# Patient Record
Sex: FEMALE | Race: WHITE | NOT HISPANIC OR LATINO | Employment: OTHER | URBAN - METROPOLITAN AREA
[De-identification: names, ages, dates, MRNs, and addresses within clinical notes are randomized per-mention and may not be internally consistent; named-entity substitution may affect disease eponyms.]

---

## 2017-02-02 ENCOUNTER — OFFICE VISIT (OUTPATIENT)
Dept: URGENT CARE | Facility: CLINIC | Age: 72
End: 2017-02-02
Payer: MEDICARE

## 2017-02-02 VITALS
RESPIRATION RATE: 18 BRPM | TEMPERATURE: 97.5 F | DIASTOLIC BLOOD PRESSURE: 72 MMHG | SYSTOLIC BLOOD PRESSURE: 110 MMHG | HEART RATE: 78 BPM | OXYGEN SATURATION: 97 %

## 2017-02-02 DIAGNOSIS — J20.8 ACUTE BACTERIAL BRONCHITIS: ICD-10-CM

## 2017-02-02 DIAGNOSIS — B96.89 ACUTE BACTERIAL BRONCHITIS: ICD-10-CM

## 2017-02-02 DIAGNOSIS — I10 ESSENTIAL HYPERTENSION: ICD-10-CM

## 2017-02-02 DIAGNOSIS — J98.8 RTI (RESPIRATORY TRACT INFECTION): ICD-10-CM

## 2017-02-02 PROCEDURE — 3017F COLORECTAL CA SCREEN DOC REV: CPT | Mod: 8P | Performed by: FAMILY MEDICINE

## 2017-02-02 PROCEDURE — G8432 DEP SCR NOT DOC, RNG: HCPCS | Performed by: FAMILY MEDICINE

## 2017-02-02 PROCEDURE — 4004F PT TOBACCO SCREEN RCVD TLK: CPT | Performed by: FAMILY MEDICINE

## 2017-02-02 PROCEDURE — 4040F PNEUMOC VAC/ADMIN/RCVD: CPT | Performed by: FAMILY MEDICINE

## 2017-02-02 PROCEDURE — 1101F PT FALLS ASSESS-DOCD LE1/YR: CPT | Mod: 8P | Performed by: FAMILY MEDICINE

## 2017-02-02 PROCEDURE — 99214 OFFICE O/P EST MOD 30 MIN: CPT | Performed by: FAMILY MEDICINE

## 2017-02-02 PROCEDURE — G8419 CALC BMI OUT NRM PARAM NOF/U: HCPCS | Performed by: FAMILY MEDICINE

## 2017-02-02 PROCEDURE — 3014F SCREEN MAMMO DOC REV: CPT | Performed by: FAMILY MEDICINE

## 2017-02-02 PROCEDURE — G8484 FLU IMMUNIZE NO ADMIN: HCPCS | Performed by: FAMILY MEDICINE

## 2017-02-02 RX ORDER — AMOXICILLIN AND CLAVULANATE POTASSIUM 875; 125 MG/1; MG/1
1 TABLET, FILM COATED ORAL 2 TIMES DAILY
Qty: 14 TAB | Refills: 0 | Status: SHIPPED | OUTPATIENT
Start: 2017-02-02 | End: 2017-02-09

## 2017-02-02 RX ORDER — CODEINE PHOSPHATE AND GUAIFENESIN 10; 100 MG/5ML; MG/5ML
10 SOLUTION ORAL EVERY 6 HOURS PRN
Qty: 240 ML | Refills: 0 | Status: SHIPPED | OUTPATIENT
Start: 2017-02-02 | End: 2018-09-21

## 2017-02-02 ASSESSMENT — ENCOUNTER SYMPTOMS
FEVER: 0
COUGH: 1
HEMOPTYSIS: 0
ORTHOPNEA: 0
SHORTNESS OF BREATH: 0
FOCAL WEAKNESS: 0
CHILLS: 0
DIZZINESS: 0

## 2017-02-02 NOTE — PROGRESS NOTES
Subjective:      Shannan Andres is a 71 y.o. female who presents with Cough    Chief Complaint   Patient presents with   • Cough     Few days stuffy nose , dry cough , chills .        -  4-5 days sinus congestion w/ DC. Ongoing cough w/ sputum since last visit that never really went away. No NVFC    - Hx HTN/RA. Stable on current meds.               Cough  Pertinent negatives include no chest pain, chills, fever, hemoptysis or shortness of breath.       Review of Systems   Constitutional: Negative for fever and chills.   Respiratory: Positive for cough. Negative for hemoptysis and shortness of breath.    Cardiovascular: Negative for chest pain and orthopnea.   Neurological: Negative for dizziness and focal weakness.          Objective:     /72 mmHg  Pulse 78  Temp(Src) 36.4 °C (97.5 °F)  Resp 18  SpO2 97%     Physical Exam   Constitutional: She appears well-developed. No distress.   HENT:   Head: Normocephalic and atraumatic.   Cardiovascular: Regular rhythm.    No murmur heard.  Pulmonary/Chest: Effort normal and breath sounds normal.   Neurological: She is alert.   Skin: Skin is warm and dry.   Psychiatric: She has a normal mood and affect. Judgment normal.   Nursing note and vitals reviewed.              Assessment/Plan:         1. RTI (respiratory tract infection)     2. Acute bacterial bronchitis  amoxicillin-clavulanate (AUGMENTIN) 875-125 MG Tab    guaifenesin-codeine (ROBITUSSIN AC) Solution oral solution             Dx & d/c instructions discussed w/ patient and/or family members. Follow up w/ Prvt Dr or here in 3-4 days if not getting better, sooner if needed,  ER if worse and UC/PCP unavailable.        Possible side effects (i.e. Rash, GI upset/constipation, sedation, elevation of BP or sugars) of any medications given discussed.

## 2017-02-02 NOTE — MR AVS SNAPSHOT
Shannan Andres   2017 1:45 PM   Office Visit   MRN: [id number]    Department:  [facility]   Dept Phone:  [phone]    Description:  Female : 1945   Provider:  Omar Chicas M.D.           Reason for Visit     Cough Few days stuffy nose , dry cough , chills .      Allergies as of 2017     Allergen Noted Reactions    Nkda [No Known Drug Allergy] 2009       Other Environmental 2013   Runny Nose    Runny nose, seasonal hayfever      You were diagnosed with     RTI (respiratory tract infection)   [564965]       Acute bacterial bronchitis   [993175]       Essential hypertension   [7568699]         Vital Signs     Blood Pressure Pulse Temperature Respirations Oxygen Saturation Smoking Status    110/72 mmHg 78 36.4 °C (97.5 °F) 18 97% Current Every Day Smoker      Basic Information     Date Of Birth Sex Race Ethnicity Preferred Language    1945 Female White Non- English      Your appointments     2017  9:30 AM   Follow Up Visit with Indiana Rawls M.D.   [facility] (--)    [address] [place] [address]  [facility] [id number] [phone]           You will be receiving a confirmation call a few days before your appointment from our automated call confirmation system.            2017 11:00 AM   EST MISC Infusion 2 HR with RN 1   Infusion Services ([facility])    [address] [place] [id number]   [phone]            2017 11:00 AM   EST MISC Infusion 2 HR with RN 1   Infusion Services ([facility])    [address] [place] [id number]   [phone]              Problem List              ICD-10-CM Priority Class Noted - Resolved    Seropositive rheumatoid arthritis (CMS-HCC) M05.9   2014 - Present    Osteoarthritis M19.90   2014 - Present    Overweight E66.3   2014 - Present    Hypertension I10   2014 - Present    Seasonal allergies J30.2   2014 - Present    S/P  shoulder surgery Z98.890   2/20/2014 - Present    Fracture of 2nd metatarsal S92.323A   2/20/2014 - Present    Cataracts, both eyes H26.9   2/20/2014 - Present    Menopause Z78.0   2/20/2014 - Present    Hepatitis A B15.9   2/20/2014 - Present    S/P total knee replacement Z96.659   2/20/2014 - Present    Lumbar disc disease M51.9   2/20/2014 - Present    Hypothyroid E03.9   2/20/2014 - Present    H/O arthroscopy of shoulder Z98.890   Unknown - Present    Carpal tunnel syndrome of left wrist G56.02   8/4/2016 - Present      Health Maintenance        Date Due Completion Dates    IMM DTaP/Tdap/Td Vaccine (1 - Tdap) 12/16/1964 ---    PAP SMEAR 12/16/1966 ---    COLONOSCOPY 12/16/1995 ---    IMM ZOSTER VACCINE 12/16/2005 ---    IMM PNEUMOCOCCAL 65+ (ADULT) LOW/MEDIUM RISK SERIES (2 of 2 - PCV13) 11/1/2013 11/1/2012    IMM INFLUENZA (1) 9/1/2016 11/1/2012    MAMMOGRAM 7/20/2017 7/20/2016, 2/26/2015, 10/16/2013, 1/27/2012, 4/8/2010, 4/8/2010, 5/14/2007, 5/14/2007, 3/18/2005    BONE DENSITY 8/25/2021 8/25/2016, 1/27/2012, 5/14/2007            Current Immunizations     Influenza TIV (IM) 11/1/2012    Pneumococcal polysaccharide vaccine (PPSV-23) 11/1/2012    Tuberculin Skin Test 9/2/2014  2:16 PM, 8/25/2014 12:15 PM      Below and/or attached are the medications your provider expects you to take. Review all of your home medications and newly ordered medications with your provider and/or pharmacist. Follow medication instructions as directed by your provider and/or pharmacist. Please keep your medication list with you and share with your provider. Update the information when medications are discontinued, doses are changed, or new medications (including over-the-counter products) are added; and carry medication information at all times in the event of emergency situations     Allergies:  NKDA - (reactions not documented)     OTHER ENVIRONMENTAL - Runny Nose               Medications  Valid as of: February 02, 2017 -  2:18 PM      Generic Name Brand Name Tablet Size Instructions for use    Amoxicillin (Cap) AMOXIL 500 MG Take 500 mg by mouth 3 times a day.        Amoxicillin-Pot Clavulanate (Tab) AUGMENTIN 875-125 MG Take 1 Tab by mouth 2 times a day for 7 days.        Aspirin (Tablet Delayed Response) ECOTRIN 81 MG Take 81 mg by mouth every day.        Azithromycin (Tab) ZITHROMAX 250 MG Take 2 tabs by mouth once today, then one tab by mouth once daily days 2-5.  Complete all medication.        Benzonatate (Cap) TESSALON 100 MG Take 1 Cap by mouth 3 times a day as needed for Cough.        Cholecalciferol (Tab) cholecalciferol 1000 UNIT Take 4,000 Units by mouth every day.        Cimetidine   Take  by mouth.        Folic Acid (Tab) FOLVITE 1 MG TAKE ONE TABLET BY MOUTH ONCE DAILY        Gabapentin (Cap) NEURONTIN 100 MG Take  by mouth every evening.        Guaifenesin-Codeine (Solution) ROBITUSSIN -10 mg/5mL Take 10 mL by mouth every 6 hours as needed for Cough.        Hydrocod Polst-Chlorphen Polst (Suspension Extended Release) TUSSIONEX 10-8 MG/5ML Take 5 mL by mouth every 12 hours.        Hydrocodone-Acetaminophen (Tab) NORCO  MG Take 1-2 Tabs by mouth every 8 hours.        Ibuprofen (Tab) MOTRIN 800 MG Take 800 mg by mouth every 8 hours as needed.        Levothyroxine Sodium (Tab) SYNTHROID 125 MCG Take  by mouth every day.        Lisinopril (Tab) PRINIVIL 20 MG Take  by mouth.Daily pm        Methotrexate Sodium (Tab) methotrexate 2.5 MG TAKE SIX TABLETS BY MOUTH ONCE A WEEK        Oxymetazoline HCl (Solution) AFRIN 0.05 % Spray 2 Sprays in nose 2 times a day. Do not use greater than 3-5 rob   Indications: Stuffy Nose        Simvastatin (Tab) ZOCOR 20 MG Take 20 mg by mouth every evening.        .                 Medicines prescribed today were sent to:     [facility] [place] [facility] [address]    [address] [place] [address]    Phone: [phone] Fax: [phone]    Open 24 Hours?: No       Medication refill instructions:       If your prescription bottle indicates you have medication refills left, it is not necessary to call your provider’s office. Please contact your pharmacy and they will refill your medication.    If your prescription bottle indicates you do not have any refills left, you may request refills at any time through one of the following ways: The online [employer] system (except Urgent Care), by calling your provider’s office, or by asking your pharmacy to contact your provider’s office with a refill request. Medication refills are processed only during regular business hours and may not be available until the next business day. Your provider may request additional information or to have a follow-up visit with you prior to refilling your medication.   *Please Note: Medication refills are assigned a new Rx number when refilled electronically. Your pharmacy may indicate that no refills were authorized even though a new prescription for the same medication is available at the pharmacy. Please request the medicine by name with the pharmacy before contacting your provider for a refill.           [employer] Status: Patient Declined

## 2017-02-06 ENCOUNTER — OFFICE VISIT (OUTPATIENT)
Dept: RHEUMATOLOGY | Facility: PHYSICIAN GROUP | Age: 72
End: 2017-02-06
Payer: MEDICARE

## 2017-02-06 VITALS
TEMPERATURE: 98.6 F | SYSTOLIC BLOOD PRESSURE: 122 MMHG | RESPIRATION RATE: 16 BRPM | WEIGHT: 225 LBS | DIASTOLIC BLOOD PRESSURE: 58 MMHG | HEART RATE: 60 BPM | BODY MASS INDEX: 36.33 KG/M2 | OXYGEN SATURATION: 94 %

## 2017-02-06 DIAGNOSIS — M05.9 SEROPOSITIVE RHEUMATOID ARTHRITIS (HCC): ICD-10-CM

## 2017-02-06 DIAGNOSIS — I10 ESSENTIAL HYPERTENSION: ICD-10-CM

## 2017-02-06 DIAGNOSIS — G56.02 CARPAL TUNNEL SYNDROME OF LEFT WRIST: ICD-10-CM

## 2017-02-06 DIAGNOSIS — E03.8 OTHER SPECIFIED HYPOTHYROIDISM: ICD-10-CM

## 2017-02-06 DIAGNOSIS — M85.80 OSTEOPENIA: ICD-10-CM

## 2017-02-06 PROCEDURE — G8432 DEP SCR NOT DOC, RNG: HCPCS | Performed by: INTERNAL MEDICINE

## 2017-02-06 PROCEDURE — 99214 OFFICE O/P EST MOD 30 MIN: CPT | Performed by: INTERNAL MEDICINE

## 2017-02-06 PROCEDURE — 4004F PT TOBACCO SCREEN RCVD TLK: CPT | Performed by: INTERNAL MEDICINE

## 2017-02-06 PROCEDURE — 3017F COLORECTAL CA SCREEN DOC REV: CPT | Mod: 8P | Performed by: INTERNAL MEDICINE

## 2017-02-06 PROCEDURE — G8419 CALC BMI OUT NRM PARAM NOF/U: HCPCS | Performed by: INTERNAL MEDICINE

## 2017-02-06 PROCEDURE — 3014F SCREEN MAMMO DOC REV: CPT | Performed by: INTERNAL MEDICINE

## 2017-02-06 PROCEDURE — G8484 FLU IMMUNIZE NO ADMIN: HCPCS | Performed by: INTERNAL MEDICINE

## 2017-02-06 PROCEDURE — 4040F PNEUMOC VAC/ADMIN/RCVD: CPT | Performed by: INTERNAL MEDICINE

## 2017-02-06 PROCEDURE — 1101F PT FALLS ASSESS-DOCD LE1/YR: CPT | Mod: 8P | Performed by: INTERNAL MEDICINE
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
## 2017-02-06 NOTE — MR AVS SNAPSHOT
Shannan Andres   2017 9:30 AM   Office Visit   MRN: [id number]    Department:  Rheumatology [facility]   Dept Phone:  [phone]    Description:  Female : 1945   Provider:  Indiana Rawls M.D.           Reason for Visit     Follow-Up           Allergies as of 2017     Allergen Noted Reactions    Nkda [No Known Drug Allergy] 2009       Other Environmental 2013   Runny Nose    Runny nose, seasonal hayfever      You were diagnosed with     Seropositive rheumatoid arthritis (CMS-HCC)   [426428]       Osteopenia   [966092]       Essential hypertension   [0386922]       Other specified hypothyroidism   [2252932]         Vital Signs     Blood Pressure Pulse Temperature Respirations Weight Oxygen Saturation    122/58 mmHg 60 37 °C (98.6 °F) 16 102.059 kg (225 lb) 94%    Smoking Status                   Current Every Day Smoker           Basic Information     Date Of Birth Sex Race Ethnicity Preferred Language    1945 Female White Non- English      Your appointments     2017 11:00 AM   EST MISC Infusion 2 HR with RN 1   Infusion Services ([facility])    [address] [address]   [phone]            2017 11:00 AM   EST MISC Infusion 2 HR with RN 1   Infusion Services ([facility])    [address] [place] [address]   [phone]            Aug 08, 2017  9:45 AM   Follow Up Visit with Indiana Rawls M.D.   [facility] (--)    [address] [place] [address], [address]  [facility] [id number] [phone]           You will be receiving a confirmation call a few days before your appointment from our automated call confirmation system.              Problem List              ICD-10-CM Priority Class Noted - Resolved    Seropositive rheumatoid arthritis (CMS-HCC) M05.9   2014 - Present    Osteoarthritis M19.90   2014 - Present    Overweight E66.3   2014 - Present    Hypertension I10   2014 - Present    Seasonal allergies J30.2   2/19/2014 - Present    S/P shoulder surgery Z98.890   2/20/2014 - Present    Fracture of 2nd metatarsal S92.323A   2/20/2014 - Present    Cataracts, both eyes H26.9   2/20/2014 - Present    Menopause Z78.0   2/20/2014 - Present    Hepatitis A B15.9   2/20/2014 - Present    S/P total knee replacement Z96.659   2/20/2014 - Present    Lumbar disc disease M51.9   2/20/2014 - Present    Hypothyroid E03.9   2/20/2014 - Present    H/O arthroscopy of shoulder Z98.890   Unknown - Present    Carpal tunnel syndrome of left wrist G56.02   8/4/2016 - Present      Health Maintenance        Date Due Completion Dates    IMM DTaP/Tdap/Td Vaccine (1 - Tdap) 12/16/1964 ---    PAP SMEAR 12/16/1966 ---    COLONOSCOPY 12/16/1995 ---    IMM ZOSTER VACCINE 12/16/2005 ---    IMM PNEUMOCOCCAL 65+ (ADULT) LOW/MEDIUM RISK SERIES (2 of 2 - PCV13) 11/1/2013 11/1/2012    IMM INFLUENZA (1) 9/1/2016 11/1/2012    MAMMOGRAM 7/20/2017 7/20/2016, 2/26/2015, 10/16/2013, 1/27/2012, 4/8/2010, 4/8/2010, 5/14/2007, 5/14/2007, 3/18/2005    BONE DENSITY 8/25/2021 8/25/2016, 1/27/2012, 5/14/2007            Current Immunizations     Influenza TIV (IM) 11/1/2012    Pneumococcal polysaccharide vaccine (PPSV-23) 11/1/2012    Tuberculin Skin Test 9/2/2014  2:16 PM, 8/25/2014 12:15 PM      Below and/or attached are the medications your provider expects you to take. Review all of your home medications and newly ordered medications with your provider and/or pharmacist. Follow medication instructions as directed by your provider and/or pharmacist. Please keep your medication list with you and share with your provider. Update the information when medications are discontinued, doses are changed, or new medications (including over-the-counter products) are added; and carry medication information at all times in the event of emergency situations     Allergies:  NKDA - (reactions not documented)     OTHER ENVIRONMENTAL - Runny Nose                  Medications  Valid as of: February 06, 2017 -  9:54 AM    Generic Name Brand Name Tablet Size Instructions for use    Amoxicillin (Cap) AMOXIL 500 MG Take 500 mg by mouth 3 times a day.        Amoxicillin-Pot Clavulanate (Tab) AUGMENTIN 875-125 MG Take 1 Tab by mouth 2 times a day for 7 days.        Aspirin (Tablet Delayed Response) ECOTRIN 81 MG Take 81 mg by mouth every day.        Azithromycin (Tab) ZITHROMAX 250 MG Take 2 tabs by mouth once today, then one tab by mouth once daily days 2-5.  Complete all medication.        Benzonatate (Cap) TESSALON 100 MG Take 1 Cap by mouth 3 times a day as needed for Cough.        Cholecalciferol (Tab) cholecalciferol 1000 UNIT Take 4,000 Units by mouth every day.        Cimetidine   Take  by mouth.        Folic Acid (Tab) FOLVITE 1 MG TAKE ONE TABLET BY MOUTH ONCE DAILY        Gabapentin (Cap) NEURONTIN 100 MG Take  by mouth every evening.        Guaifenesin-Codeine (Solution) ROBITUSSIN -10 mg/5mL Take 10 mL by mouth every 6 hours as needed for Cough.        Hydrocod Polst-Chlorphen Polst (Suspension Extended Release) TUSSIONEX 10-8 MG/5ML Take 5 mL by mouth every 12 hours.        Hydrocodone-Acetaminophen (Tab) NORCO  MG Take 1-2 Tabs by mouth every 8 hours.        Ibuprofen (Tab) MOTRIN 800 MG Take 800 mg by mouth every 8 hours as needed.        Levothyroxine Sodium (Tab) SYNTHROID 125 MCG Take  by mouth every day.        Lisinopril (Tab) PRINIVIL 20 MG Take  by mouth.Daily pm        Methotrexate Sodium (Tab) methotrexate 2.5 MG TAKE SIX TABLETS BY MOUTH ONCE A WEEK        Oxymetazoline HCl (Solution) AFRIN 0.05 % Spray 2 Sprays in nose 2 times a day. Do not use greater than 3-5 rob   Indications: Stuffy Nose        Simvastatin (Tab) ZOCOR 20 MG Take 20 mg by mouth every evening.        .                 Medicines prescribed today were sent to:     [facility] [place] [place] - [address]    [address] [place] [address]    Phone:  [phone] Fax: [phone]    Open 24 Hours?: No      Medication refill instructions:       If your prescription bottle indicates you have medication refills left, it is not necessary to call your provider’s office. Please contact your pharmacy and they will refill your medication.    If your prescription bottle indicates you do not have any refills left, you may request refills at any time through one of the following ways: The online [employer] system (except Urgent Care), by calling your provider’s office, or by asking your pharmacy to contact your provider’s office with a refill request. Medication refills are processed only during regular business hours and may not be available until the next business day. Your provider may request additional information or to have a follow-up visit with you prior to refilling your medication.   *Please Note: Medication refills are assigned a new Rx number when refilled electronically. Your pharmacy may indicate that no refills were authorized even though a new prescription for the same medication is available at the pharmacy. Please request the medicine by name with the pharmacy before contacting your provider for a refill.        Your To Do List     Future Labs/Procedures Complete By Expires    CBC WITH DIFFERENTIAL  3/1/2017 2/6/2018    COMP METABOLIC PANEL  3/1/2017 2/6/2018    WESTERGREN SED RATE  3/1/2017 2/6/2018         [employer] Status: Patient Declined

## 2017-02-06 NOTE — Clinical Note
[facility]   [address], [address]  [place] [address]  Phone: [phone]  Fax: [phone]              Encounter Date: 2/6/2017    Dear Dr. Dixon ref. provider found,    It was a pleasure seeing your patient, Shannan Andres, on 2/6/2017. Diagnoses of Seropositive rheumatoid arthritis (CMS-HCC), Osteopenia, Essential hypertension, Other specified hypothyroidism, and Carpal tunnel syndrome of left wrist were pertinent to this visit.     Please find attached progress note which includes the history I obtained from Ms. Andres, my physical examination findings, my impression and recommendations.      Once again, it was a pleasure participating in your patient's care.  Please feel free to contact me if you have any questions or if I can be of any further assistance to your patients.      Sincerely,    Indiana Rawls M.D.  Electronically Signed          PROGRESS NOTE:  No notes on file

## 2017-02-16 ENCOUNTER — OUTPATIENT INFUSION SERVICES (OUTPATIENT)
Dept: ONCOLOGY | Facility: MEDICAL CENTER | Age: 72
End: 2017-02-16
Attending: INTERNAL MEDICINE
Payer: MEDICARE

## 2017-02-16 VITALS
TEMPERATURE: 97.5 F | SYSTOLIC BLOOD PRESSURE: 121 MMHG | HEIGHT: 66 IN | OXYGEN SATURATION: 99 % | BODY MASS INDEX: 36.56 KG/M2 | DIASTOLIC BLOOD PRESSURE: 72 MMHG | WEIGHT: 227.51 LBS | HEART RATE: 55 BPM | RESPIRATION RATE: 18 BRPM

## 2017-02-16 DIAGNOSIS — M05.9 SEROPOSITIVE RHEUMATOID ARTHRITIS (HCC): ICD-10-CM

## 2017-02-16 PROCEDURE — 700105 HCHG RX REV CODE 258: Performed by: INTERNAL MEDICINE

## 2017-02-16 PROCEDURE — 700111 HCHG RX REV CODE 636 W/ 250 OVERRIDE (IP): Performed by: INTERNAL MEDICINE

## 2017-02-16 PROCEDURE — 96415 CHEMO IV INFUSION ADDL HR: CPT

## 2017-02-16 PROCEDURE — 96413 CHEMO IV INFUSION 1 HR: CPT

## 2017-02-16 RX ADMIN — INFLIXIMAB 300 MG: 100 INJECTION, POWDER, LYOPHILIZED, FOR SOLUTION INTRAVENOUS at 09:50

## 2017-02-16 ASSESSMENT — PAIN SCALES - GENERAL: PAINLEVEL: 3=SLIGHT PAIN

## 2017-02-16 NOTE — Clinical Note
Infusion Services   [address] [place] [address]  [place] [address]  Phone: [phone]  Fax: [phone]              Dear MD Rawls,    Your patient, Shannan Andres (: 1945), was scheduled at [facility].  Shannan's encounter diagnosis is: Rheumatoid Arthritis    She was treated with Remicade  without an incident.      Her next appointment is 17.    For more information, you may review the nurse's progress notes in chart review under the notes section.       Sincerely,  Reny Almazan R.N.

## 2017-02-16 NOTE — PROGRESS NOTES
Pt is here for her scheduled Remicade to treat RA. Does not take premedications. Denies current infections. She did have flu, but has finished her antibiotics and denies any s/s lingering malaise. Infusion completed without an incident. Discharged home to self care. Next appointement verified.

## 2017-04-05 ENCOUNTER — HOSPITAL ENCOUNTER (OUTPATIENT)
Dept: LAB | Facility: MEDICAL CENTER | Age: 72
End: 2017-04-05
Attending: INTERNAL MEDICINE
Payer: MEDICARE

## 2017-04-05 DIAGNOSIS — M05.9 SEROPOSITIVE RHEUMATOID ARTHRITIS (HCC): ICD-10-CM

## 2017-04-05 LAB
ALBUMIN SERPL BCP-MCNC: 3.8 G/DL (ref 3.2–4.9)
ALBUMIN/GLOB SERPL: 1.5 G/DL
ALP SERPL-CCNC: 53 U/L (ref 30–99)
ALT SERPL-CCNC: 16 U/L (ref 2–50)
ANION GAP SERPL CALC-SCNC: 8 MMOL/L (ref 0–11.9)
AST SERPL-CCNC: 26 U/L (ref 12–45)
BASOPHILS # BLD AUTO: 1.1 % (ref 0–1.8)
BASOPHILS # BLD: 0.07 K/UL (ref 0–0.12)
BILIRUB SERPL-MCNC: 0.6 MG/DL (ref 0.1–1.5)
BUN SERPL-MCNC: 17 MG/DL (ref 8–22)
CALCIUM SERPL-MCNC: 8.9 MG/DL (ref 8.4–10.2)
CHLORIDE SERPL-SCNC: 104 MMOL/L (ref 96–112)
CO2 SERPL-SCNC: 27 MMOL/L (ref 20–33)
CREAT SERPL-MCNC: 0.81 MG/DL (ref 0.5–1.4)
EOSINOPHIL # BLD AUTO: 0.27 K/UL (ref 0–0.51)
EOSINOPHIL NFR BLD: 4.1 % (ref 0–6.9)
ERYTHROCYTE [DISTWIDTH] IN BLOOD BY AUTOMATED COUNT: 51.4 FL (ref 35.9–50)
ERYTHROCYTE [SEDIMENTATION RATE] IN BLOOD BY WESTERGREN METHOD: 5 MM/HOUR (ref 0–30)
GFR SERPL CREATININE-BSD FRML MDRD: >60 ML/MIN/1.73 M 2
GLOBULIN SER CALC-MCNC: 2.6 G/DL (ref 1.9–3.5)
GLUCOSE SERPL-MCNC: 97 MG/DL (ref 65–99)
HCT VFR BLD AUTO: 37.3 % (ref 37–47)
HGB BLD-MCNC: 12.5 G/DL (ref 12–16)
IMM GRANULOCYTES # BLD AUTO: 0 K/UL (ref 0–0.11)
IMM GRANULOCYTES NFR BLD AUTO: 0 % (ref 0–0.9)
LYMPHOCYTES # BLD AUTO: 3.67 K/UL (ref 1–4.8)
LYMPHOCYTES NFR BLD: 56.3 % (ref 22–41)
MCH RBC QN AUTO: 32.4 PG (ref 27–33)
MCHC RBC AUTO-ENTMCNC: 33.5 G/DL (ref 33.6–35)
MCV RBC AUTO: 96.6 FL (ref 81.4–97.8)
MONOCYTES # BLD AUTO: 0.48 K/UL (ref 0–0.85)
MONOCYTES NFR BLD AUTO: 7.4 % (ref 0–13.4)
NEUTROPHILS # BLD AUTO: 2.03 K/UL (ref 2–7.15)
NEUTROPHILS NFR BLD: 31.1 % (ref 44–72)
NRBC # BLD AUTO: 0 K/UL
NRBC BLD AUTO-RTO: 0 /100 WBC
PLATELET # BLD AUTO: 162 K/UL (ref 164–446)
PMV BLD AUTO: 12.3 FL (ref 9–12.9)
POTASSIUM SERPL-SCNC: 4.5 MMOL/L (ref 3.6–5.5)
PROT SERPL-MCNC: 6.4 G/DL (ref 6–8.2)
RBC # BLD AUTO: 3.86 M/UL (ref 4.2–5.4)
SODIUM SERPL-SCNC: 139 MMOL/L (ref 135–145)
WBC # BLD AUTO: 6.5 K/UL (ref 4.8–10.8)

## 2017-04-05 PROCEDURE — 85652 RBC SED RATE AUTOMATED: CPT

## 2017-04-05 PROCEDURE — 85025 COMPLETE CBC W/AUTO DIFF WBC: CPT

## 2017-04-05 PROCEDURE — 80053 COMPREHEN METABOLIC PANEL: CPT

## 2017-04-05 PROCEDURE — 36415 COLL VENOUS BLD VENIPUNCTURE: CPT

## 2017-04-13 ENCOUNTER — OUTPATIENT INFUSION SERVICES (OUTPATIENT)
Dept: ONCOLOGY | Facility: MEDICAL CENTER | Age: 72
End: 2017-04-13
Attending: INTERNAL MEDICINE
Payer: MEDICARE

## 2017-04-13 VITALS
HEIGHT: 66 IN | SYSTOLIC BLOOD PRESSURE: 106 MMHG | WEIGHT: 225.75 LBS | HEART RATE: 66 BPM | RESPIRATION RATE: 18 BRPM | TEMPERATURE: 97.5 F | DIASTOLIC BLOOD PRESSURE: 68 MMHG | BODY MASS INDEX: 36.28 KG/M2 | OXYGEN SATURATION: 95 %

## 2017-04-13 PROCEDURE — 700111 HCHG RX REV CODE 636 W/ 250 OVERRIDE (IP): Performed by: INTERNAL MEDICINE

## 2017-04-13 PROCEDURE — 96415 CHEMO IV INFUSION ADDL HR: CPT

## 2017-04-13 PROCEDURE — 700105 HCHG RX REV CODE 258: Performed by: INTERNAL MEDICINE

## 2017-04-13 PROCEDURE — 96413 CHEMO IV INFUSION 1 HR: CPT

## 2017-04-13 RX ORDER — ACETAMINOPHEN 325 MG/1
650 TABLET ORAL ONCE
Status: DISCONTINUED | OUTPATIENT
Start: 2017-04-13 | End: 2017-04-13 | Stop reason: HOSPADM

## 2017-04-13 RX ORDER — CALCIUM CARBONATE 500(1250)
1000 TABLET ORAL 2 TIMES DAILY WITH MEALS
COMMUNITY

## 2017-04-13 RX ADMIN — INFLIXIMAB 300 MG: 100 INJECTION, POWDER, LYOPHILIZED, FOR SOLUTION INTRAVENOUS at 14:05

## 2017-04-13 ASSESSMENT — PAIN SCALES - GENERAL: PAINLEVEL: 8=MODERATE-SEVERE PAIN

## 2017-04-13 NOTE — PROGRESS NOTES
Patient arrived to Infusion for Remicade infusion; reports no changes or concerns.  PIV started, pt refused pre-meds.  Remicade infused over 2 hours, pt tolerated well. PIV removed, gauze pressure dressing in place. Next appts confirmed. Pt left in great spirits under no apparent distress.

## 2017-06-08 ENCOUNTER — OUTPATIENT INFUSION SERVICES (OUTPATIENT)
Dept: ONCOLOGY | Facility: MEDICAL CENTER | Age: 72
End: 2017-06-08
Attending: INTERNAL MEDICINE
Payer: MEDICARE

## 2017-06-08 VITALS
HEIGHT: 66 IN | HEART RATE: 64 BPM | BODY MASS INDEX: 35.68 KG/M2 | TEMPERATURE: 97 F | SYSTOLIC BLOOD PRESSURE: 126 MMHG | DIASTOLIC BLOOD PRESSURE: 63 MMHG | WEIGHT: 222 LBS | RESPIRATION RATE: 18 BRPM | OXYGEN SATURATION: 96 %

## 2017-06-08 PROCEDURE — 96413 CHEMO IV INFUSION 1 HR: CPT

## 2017-06-08 PROCEDURE — 700111 HCHG RX REV CODE 636 W/ 250 OVERRIDE (IP): Performed by: INTERNAL MEDICINE

## 2017-06-08 PROCEDURE — 700105 HCHG RX REV CODE 258: Performed by: INTERNAL MEDICINE

## 2017-06-08 PROCEDURE — 96415 CHEMO IV INFUSION ADDL HR: CPT

## 2017-06-08 RX ADMIN — INFLIXIMAB 300 MG: 100 INJECTION, POWDER, LYOPHILIZED, FOR SOLUTION INTRAVENOUS at 11:39

## 2017-06-08 ASSESSMENT — PAIN SCALES - GENERAL: PAINLEVEL: 7=MODERATE-SEVERE PAIN

## 2017-06-08 NOTE — PROGRESS NOTES
Patient arrived to [facility] ambulatory with front wheel walker for Remicade infusion. Vital signs, denies signs and symptoms of infection. Patient declined pre-treatment medications. IV established in left forearm. Patient tolerated infusion without issue or reaction. Patient provided card for next appointment. IV removed from left forearm, gauze and coband dressing placed. Patient left the [facility] ambulatory with front wheel walker in no signs of distress at 1356.

## 2017-08-01 ENCOUNTER — APPOINTMENT (OUTPATIENT)
Dept: ONCOLOGY | Facility: MEDICAL CENTER | Age: 72
End: 2017-08-01
Attending: INTERNAL MEDICINE
Payer: MEDICARE

## 2017-08-01 VITALS
SYSTOLIC BLOOD PRESSURE: 118 MMHG | HEART RATE: 65 BPM | BODY MASS INDEX: 34.08 KG/M2 | OXYGEN SATURATION: 97 % | WEIGHT: 212.08 LBS | HEIGHT: 66 IN | RESPIRATION RATE: 18 BRPM | TEMPERATURE: 97.4 F | DIASTOLIC BLOOD PRESSURE: 58 MMHG

## 2017-08-01 PROCEDURE — 306780 HCHG STAT FOR TRANSFUSION PER CASE

## 2017-08-01 NOTE — PROGRESS NOTES
"Pt arrived to [facility], ambulatory, for Remicade infusion. Pt states that she recently saw her PCP and had a UA, which showed \"blood in the urine.\" Pt states that they are sending her a new UA kit to re-test for potential UTI. Pt also states that she has had an \"upset stomach\" for a few days. Pt educated about risks of infusing Remicade with an active infection, pt wishes to reschedule after she gets her results. Pt left [facility] with no s/sx of distress. Follow up appointment to be determined by pt after UA.   "

## 2017-08-02 ENCOUNTER — OFFICE VISIT (OUTPATIENT)
Dept: RHEUMATOLOGY | Facility: PHYSICIAN GROUP | Age: 72
End: 2017-08-02
Payer: MEDICARE

## 2017-08-02 VITALS
SYSTOLIC BLOOD PRESSURE: 110 MMHG | TEMPERATURE: 97.9 F | BODY MASS INDEX: 34.17 KG/M2 | WEIGHT: 212.6 LBS | DIASTOLIC BLOOD PRESSURE: 62 MMHG | HEART RATE: 55 BPM | HEIGHT: 66 IN | OXYGEN SATURATION: 96 % | RESPIRATION RATE: 16 BRPM

## 2017-08-02 DIAGNOSIS — M85.89 OSTEOPENIA OF MULTIPLE SITES: ICD-10-CM

## 2017-08-02 DIAGNOSIS — I10 ESSENTIAL HYPERTENSION: ICD-10-CM

## 2017-08-02 DIAGNOSIS — M62.838 MUSCLE SPASM: ICD-10-CM

## 2017-08-02 DIAGNOSIS — Z79.631 ON METHOTREXATE THERAPY: ICD-10-CM

## 2017-08-02 DIAGNOSIS — E03.9 HYPOTHYROIDISM, UNSPECIFIED TYPE: ICD-10-CM

## 2017-08-02 DIAGNOSIS — M05.9 SEROPOSITIVE RHEUMATOID ARTHRITIS (HCC): ICD-10-CM

## 2017-08-02 DIAGNOSIS — G56.02 CARPAL TUNNEL SYNDROME OF LEFT WRIST: ICD-10-CM

## 2017-08-02 DIAGNOSIS — M51.9 LUMBAR DISC DISEASE: ICD-10-CM

## 2017-08-02 DIAGNOSIS — M25.552 PAIN OF LEFT HIP JOINT: ICD-10-CM

## 2017-08-02 PROCEDURE — 700111 HCHG RX REV CODE 636 W/ 250 OVERRIDE (IP)

## 2017-08-02 PROCEDURE — 99214 OFFICE O/P EST MOD 30 MIN: CPT | Performed by: INTERNAL MEDICINE

## 2017-08-02 RX ORDER — TIZANIDINE 4 MG/1
TABLET ORAL
Qty: 30 TAB | Refills: 2 | Status: SHIPPED | OUTPATIENT
Start: 2017-08-02 | End: 2018-09-21

## 2017-08-02 ASSESSMENT — PAIN SCALES - GENERAL: PAINLEVEL: 7=MODERATE-SEVERE PAIN

## 2017-08-02 NOTE — Clinical Note
[facility]   [address], [address]  [place] [address]  Phone: [phone]  Fax: [phone]              Encounter Date: 8/2/2017    Dear Dr. Dixon ref. provider found,    It was a pleasure seeing your patient, Shannan Andres, on 8/2/2017. Diagnoses of Seropositive rheumatoid arthritis (CMS-HCC), Osteopenia of multiple sites, On methotrexate therapy, Pain of left hip joint, Muscle spasm, Carpal tunnel syndrome of left wrist, Lumbar disc disease, Hypothyroidism, unspecified type, and Essential hypertension were pertinent to this visit.     Please find attached progress note which includes the history I obtained from Ms. Andres, my physical examination findings, my impression and recommendations.      Once again, it was a pleasure participating in your patient's care.  Please feel free to contact me if you have any questions or if I can be of any further assistance to your patients.      Sincerely,    Indiana Rawls M.D.  Electronically Signed          PROGRESS NOTE:  Chief Complaint- joint pain    Subjective:   Shannan Andres is a 71 y.o. female here today for follow up of rheumatological issues    This is a follow-up patient to this clinic,  previously followed by Dr Magallanes who is no longer in the clinic for rheumatoid arthritis, currently on Remicade infusions 300 mg every 8 weeks also on methotrexate 7.5 mg by mouth every week with folic acid 1 mg by mouth daily. Patient is currently off of the Remicade because of hematuria of unknown etiology, awaiting the results of her repeat urine test to assure no infection. Also of note, patient's  just recently passed away about a month ago and is grieving, is planning on probably moving to [place] within the year as this is where her daughter and grandson live. Patient denies any new neurological symptoms, denies any fevers of unknown etiology, denies any unexplained weight loss, denies any new rashes. Patient  continues to have intermittent problems with left carpal tunnel, status post cortisone shot without benefit, uses splints and has not wanted to pursue surgical intervention. Patient also complained of night cramping in her legs and also pain down into her left groin. Denies any trauma to her hip.      S/p Cimzia 12/12/12  D/Cd 09/05/2013      Bilateral TKA    Hand X-rays done 5/2008-indicate periarticular osteopenia  Feet X-rays done 5/2008-indicate periarticular osteopenia  DEXA 5/2007 T scores 1.0, 0.1  DEXA 1/2012 T scores 1.5, -0.1  DEXA 8/2016 T scores -0.6, -1.7  PPD neg 8/25/2014 [facility]; Quantiferon Gold neg 9/2016  Hep B neg 4/2016 [facility]; Hep B neg 8/2016; Hep B neg 8/2016       Current medicines (including changes today)  Current Outpatient Prescriptions   Medication Sig Dispense Refill   • InFLIXimab (REMICADE IV) by Intravenous route.     • tizanidine (ZANAFLEX) 4 MG Tab 1 tab po qhs prn muscle spasm 30 Tab 2   • methotrexate 2.5 MG Tab TAKE SIX TABLETS BY MOUTH ONCE A WEEK 84 Tab 0   • Magnesium Hydroxide (MILK OF MAGNESIA PO) Take  by mouth.     • calcium carbonate (CALCIUM CARBONATE) 500 MG Tab Take 1,000 mg by mouth 2 times a day, with meals.     • ibuprofen (MOTRIN) 800 MG Tab Take 800 mg by mouth every 8 hours as needed.     • folic acid (FOLVITE) 1 MG Tab TAKE ONE TABLET BY MOUTH ONCE DAILY 90 Tab 0   • hydrocodone/acetaminophen (NORCO)  MG Tab Take 1-2 Tabs by mouth every 8 hours.     • gabapentin (NEURONTIN) 100 MG CAPS Take  by mouth every evening.     • simvastatin (ZOCOR) 20 MG TABS Take 20 mg by mouth every evening.     • aspirin EC (ECOTRIN) 81 MG TBEC Take 81 mg by mouth every day.     • Cimetidine (EQ ACID REDUCER PO) Take  by mouth.     • vitamin D (CHOLECALCIFEROL) 1000 UNIT TABS Take 4,000 Units by mouth every day.     • LEVOTHYROXINE 125 MCG TABS Take  by mouth every day.     • LISINOPRIL 20 MG TABS Take  by mouth.Daily pm     • guaifenesin-codeine (ROBITUSSIN  "AC) Solution oral solution Take 10 mL by mouth every 6 hours as needed for Cough. 240 mL 0   • Hydrocod Polst-CPM Polst ER (TUSSIONEX) 10-8 MG/5ML Suspension Extended Release Take 5 mL by mouth every 12 hours. 140 mL 0   • benzonatate (TESSALON) 100 MG Cap Take 1 Cap by mouth 3 times a day as needed for Cough. 60 Cap 0   • amoxicillin (AMOXIL) 500 MG Cap Take 500 mg by mouth 3 times a day.     • oxymetazoline (AFRIN) 0.05 % SOLN Spray 2 Sprays in nose 2 times a day. Do not use greater than 3-5 rob   Indications: Stuffy Nose       No current facility-administered medications for this visit.     She  has a past medical history of Heart burn; Pain; Hypertension; Hepatitis A (1974); Indigestion; Arthritis; Cold; Unspecified disorder of thyroid; CATARACT; Seasonal rhinitis; H/O arthroscopy of shoulder; H/O hepatitis; H/O foot surgery; and Foot fracture, right.    ROS   Other than what is mentioned in HPI or physical exam, there is no history of headaches, double vision or blurred vision. No temporal tenderness or jaw claudication. No history of cataracts or glaucoma. No trouble swallowing difficulties or sore throats. No history of thyroid disease. No chest complaints including chest pain, cough or sputum production. No shortness of breath. No GI complaints including nausea, vomiting, change in bowel habits, or past peptic ulcer disease. No history of blood in the stools. No urinary complaints including dysuria or frequency. No history of rash including psoriasis. No history of alopecia, photosensitivity, oral ulcerations, Raynaud's phenomena, or swollen joints. No history of gout. No back complaints. No history of low blood counts.       Objective:     Blood pressure 110/62, pulse 55, temperature 36.6 °C (97.9 °F), resp. rate 16, height 1.676 m (5' 5.98\"), weight 96.435 kg (212 lb 9.6 oz), SpO2 96 %. Body mass index is 34.33 kg/(m^2).   Physical Exam:  GENERAL: Overweight.  Alert and oriented x 3, No apparent distress. " SKIN: No inflammation, normal turgor, no rashes. HEENT: Atraumatic, unremarkable. PERRLA, extraocular movements are intact. Conjunctiva clear. Fundi not examined. Temporal arteries are palpable and non-tender. THROAT: Clear. NECK: Supple with good range of motion including flexion, extension, lateral rotation and bending. No JVD, thyromegaly or adenopathy is appreciated. Carotids are palpable, no bruits. CHEST: Clear to ausculation and percussion bilaterally, no rales or rhonchi. Respiratory efforts good. BREASTS: Not examined. COR: Regular rate and rhythm. No murmurs, rubs or gallops. ABDOMEN: Soft, non-tender, non-distended. Normal active bowel sounds. No organomegaly appreciated. No hepatomegaly. EXTREMITIES: No clubbing, cyanosis, edema. MUSCULOSKELETAL: Both shoulders have full range of motion including internal and external rotation and abduction. Both elbows have full range of motion without  active synovitis. The wrists have good range of motion without limitations. The small joints of the hands are unremarkable without significant swelling or tenderness. There is some mild ulnar deviation of both hands, there is also evidence of ulnar styloid prominence but without synovitis, Do not appreciate any thenar or hyperthenar eminence atrophy on the left There are no tophi or nodules appreciated. The hips, knees, ankles and feet all have good range of motion. Low back is normal. There is no SI tenderness to compression or palpation. There is good lumbar flexion. Patient has hammertoes both feet but no mahesh excoriations or open wounds. RECTAL: Not done. : Not done. NEUROLOGICAL: Grossly intact. Motor and sensory examinations are normal. PULSES. Intact  Lab Results   Component Value Date/Time    QUANTIFERON TB GOLD Negative 09/28/2016 10:27 AM     Lab Results   Component Value Date/Time    HEPATITIS B SURFACE ANTIGEN Negative 08/01/2016 11:31 AM     Lab Results   Component Value Date/Time    SODIUM 139 04/05/2017  09:49 AM    POTASSIUM 4.5 04/05/2017 09:49 AM    CHLORIDE 104 04/05/2017 09:49 AM    CO2 27 04/05/2017 09:49 AM    GLUCOSE 97 04/05/2017 09:49 AM    BUN 17 04/05/2017 09:49 AM    CREATININE 0.81 04/05/2017 09:49 AM      Lab Results   Component Value Date/Time    WBC 6.5 04/05/2017 09:49 AM    RBC 3.86* 04/05/2017 09:49 AM    HEMOGLOBIN 12.5 04/05/2017 09:49 AM    HEMATOCRIT 37.3 04/05/2017 09:49 AM    MCV 96.6 04/05/2017 09:49 AM    MCH 32.4 04/05/2017 09:49 AM    MCHC 33.5* 04/05/2017 09:49 AM    MPV 12.3 04/05/2017 09:49 AM    NEUTROPHILS-POLYS 31.10* 04/05/2017 09:49 AM    LYMPHOCYTES 56.30* 04/05/2017 09:49 AM    MONOCYTES 7.40 04/05/2017 09:49 AM    EOSINOPHILS 4.10 04/05/2017 09:49 AM    BASOPHILS 1.10 04/05/2017 09:49 AM      Lab Results   Component Value Date/Time    CALCIUM 8.9 04/05/2017 09:49 AM    AST(SGOT) 26 04/05/2017 09:49 AM    ALT(SGPT) 16 04/05/2017 09:49 AM    ALKALINE PHOSPHATASE 53 04/05/2017 09:49 AM    TOTAL BILIRUBIN 0.6 04/05/2017 09:49 AM    ALBUMIN 3.8 04/05/2017 09:49 AM    TOTAL PROTEIN 6.4 04/05/2017 09:49 AM     Lab Results   Component Value Date/Time    SED RATE WESTERGREN 5 04/05/2017 09:49 AM     Results for orders placed during the hospital encounter of 05/27/08   DX-JOINT SURVEY-HANDS SINGLE VIEW    Impression IMPRESSION:     OSTEOARTHRITIC TYPE CHANGES INVOLVING THE DISTAL INTERPHALANGEAL JOINTS   WITH SUGGESTION OF PERIARTICULAR OSTEOPOROSIS AND SUBLUXATION AT THE LEFT   THIRD METACARPOPHALANGEAL JOINT MAY INDICATE RHEUMATOID ARTHRITIS.      KALYAN/holly     Read By DANNY CLEMENS MD on May 27 2008  4:34PM  : HOLLY Transcription Date: May 28 2008  7:47PM  THIS DOCUMENT HAS BEEN ELECTRONICALLY SIGNED BY: DANNY CLEMENS MD on   May 28 2008  8:02PM        Results for orders placed during the hospital encounter of 05/27/08   DX-JOINT SURVEY-FEET SINGLE VIEW    Impression IMPRESSION:     1. OSTEOARTHRITIC CHANGE INVOLVING THE FIRST METATARSOPHALANGEAL JOINT.      2.    SUGGESTION OF PERIARTICULAR OSTEOPOROSIS MAY INDICATE RHEUMATOID   ARTHRITIS.         Results for orders placed during the hospital encounter of 08/25/16   DS-BONE DENSITY STUDY (DEXA)    Impression According to the World Health Organization classification, bone mineral density of this patient is osteopenic for the left proximal femur and normal for the distal left forearm.        10-year Probability of Fracture:  Major Osteoporotic     31.8%  Hip     14.0%  Population      [place] ()    Based on left femur neck BMD          INTERPRETING LOCATION:  [address] [place] [address], [id number]     Results for orders placed during the hospital encounter of 03/07/13   DX-KNEES-AP BILATERAL STANDING    Impression Severe right osteoarthritis with lateral tibial subluxation    Left knee arthroplasty without evidence of loosening     Results for orders placed during the hospital encounter of 03/07/13   DX-KNEES-AP BILATERAL STANDING    Impression Severe right osteoarthritis with lateral tibial subluxation    Left knee arthroplasty without evidence of loosening     Results for orders placed during the hospital encounter of 04/10/08   DX-KNEE COMPLETE 4+    Impression IMPRESSION:     MODERATE TO SEVERE TRICOMPARTMENTAL OSTEOARTHRITIS.          Results for orders placed during the hospital encounter of 08/25/09   DX-KNEE 2-    Impression IMPRESSION:     POSTSURGICAL CHANGES SEEN CONSISTENT WITH TRICOMPARTMENT ARTHROPLASTY.     MATTHEW/nilton       Read By DOLLY JOSHUA MD on Aug 25 2009 11:36AM  : ZPT Transcription Date: Aug 25 2009 12:12PM  THIS DOCUMENT HAS BEEN ELECTRONICALLY SIGNED BY: DOLLY JOSHUA MD on Aug   25 2009  3:37PM        Results for orders placed during the hospital encounter of 04/10/08   DX-SHOULDER 2+    Impression IMPRESSION:     STABLE RIGHT SHOULDER WITH DEGENERATIVE CHANGES IN THE INFERIOR   GLENOHUMERAL JOINT AND PROBABLE POSTOPERATIVE CHANGES AT THE AC JOINT.     OFELIA/nilton     Read By ANDREA REYES  MD ALICIA on Apr 11 2008  8:24AM  : GONZALO Transcription Date: Apr 11 2008  7:43PM  THIS DOCUMENT HAS BEEN ELECTRONICALLY SIGNED BY: ANDREA VARGAS MD on   Apr 14 2008  8:30AM        Results for orders placed during the hospital encounter of 01/07/16   MR-LUMBAR SPINE-W/O    Impression 1.  Status post posterior fusion and decompressive laminectomies from L2-L4.    2.  Multilevel degenerative disc disease and facet arthropathy as described. Findings are similar to the prior exam. No discrete disc herniation or isolated nerve root compression is appreciated.    3.  Stable postoperative seroma at the L3 level.     Results for orders placed during the hospital encounter of 04/03/13   DX-CERVICAL SPINE-2 OR 3 VIEWS    Impression Arthropathy as described above.            INTERPRETING LOCATION:  [facility] [place], [address]     Assessment and Plan:     1. Seropositive rheumatoid arthritis (CMS-HCC)  Currently just on methotrexate at 7.5 mg by mouth every week and folic acid 1 mg by mouth daily, currently off of Remicade until hematuria can be resolved.  Next visit check CCP and rheumatoid factor in your calcium level.  - tizanidine (ZANAFLEX) 4 MG Tab; 1 tab po qhs prn muscle spasm  Dispense: 30 Tab; Refill: 2  - DX-HIP-UNILATERAL-W/O PELVIS-2/3 VIEWS LEFT; Future  - methotrexate 2.5 MG Tab; TAKE SIX TABLETS BY MOUTH ONCE A WEEK  Dispense: 84 Tab; Refill: 0  - CBC WITH DIFFERENTIAL; Future  - COMP METABOLIC PANEL; Future  - WESTERGREN SED RATE; Future    2. Osteopenia of multiple sites  Last DEXA August 2016, next DEXA August 2018   continue calcium 1200 mg by mouth daily and vitamin D about 1000 units by mouth daily and magnesium 400 mg by mouth daily  - tizanidine (ZANAFLEX) 4 MG Tab; 1 tab po qhs prn muscle spasm  Dispense: 30 Tab; Refill: 2  - DX-HIP-UNILATERAL-W/O PELVIS-2/3 VIEWS LEFT; Future    3. On methotrexate therapy  Labs to be done every 3 months i.e. CBC, comprehensive panel and  sedimentation rate  - tizanidine (ZANAFLEX) 4 MG Tab; 1 tab po qhs prn muscle spasm  Dispense: 30 Tab; Refill: 2  - DX-HIP-UNILATERAL-W/O PELVIS-2/3 VIEWS LEFT; Future  - CBC WITH DIFFERENTIAL; Future  - COMP METABOLIC PANEL; Future  - WESTERGREN SED RATE; Future    4. Pain of left hip joint  Today we'll do a left hip x-ray for evaluation of chondrocalcinosis first DJD versus erosive arthritis  - tizanidine (ZANAFLEX) 4 MG Tab; 1 tab po qhs prn muscle spasm  Dispense: 30 Tab; Refill: 2  - DX-HIP-UNILATERAL-W/O PELVIS-2/3 VIEWS LEFT; Future    5. Muscle spasm  Do trial tizanidine 4 mg by mouth daily at bedtime when necessary  - tizanidine (ZANAFLEX) 4 MG Tab; 1 tab po qhs prn muscle spasm  Dispense: 30 Tab; Refill: 2  - DX-HIP-UNILATERAL-W/O PELVIS-2/3 VIEWS LEFT; Future    6. Carpal tunnel syndrome of left wrist  Stable, cortisone injections ineffective, patient has a brace she wears at night, does not water pursue surgical intervention at this time  - tizanidine (ZANAFLEX) 4 MG Tab; 1 tab po qhs prn muscle spasm  Dispense: 30 Tab; Refill: 2  - DX-HIP-UNILATERAL-W/O PELVIS-2/3 VIEWS LEFT; Future    7. Lumbar disc disease  - tizanidine (ZANAFLEX) 4 MG Tab; 1 tab po qhs prn muscle spasm  Dispense: 30 Tab; Refill: 2  - DX-HIP-UNILATERAL-W/O PELVIS-2/3 VIEWS LEFT; Future    8. Hypothyroidism, unspecified type  Can exacerbate joint pain, I recommend monitoring thyroid on a regular basis to assure it is not contributing to the patient's joint pain  - tizanidine (ZANAFLEX) 4 MG Tab; 1 tab po qhs prn muscle spasm  Dispense: 30 Tab; Refill: 2  - DX-HIP-UNILATERAL-W/O PELVIS-2/3 VIEWS LEFT; Future    9. Essential hypertension  May impact the type of medications we can use for this patient's arthritis. We will have to keep this under advisement.  - tizanidine (ZANAFLEX) 4 MG Tab; 1 tab po qhs prn muscle spasm  Dispense: 30 Tab; Refill: 2  - DX-HIP-UNILATERAL-W/O PELVIS-2/3 VIEWS LEFT; Future    Followup: Return in about 3  months (around 11/2/2017). or sooner prn    Patient was seen 30 minutes face-to-face of which more than 50% of the time was spent counseling the patient (excluding time for procedures)  regarding  rheumatological condition and care. Therapy was discussed in detail.    Please note that this dictation was created using voice recognition software. I have made every reasonable attempt to correct obvious errors, but I expect that there are errors of grammar and possibly content that I did not discover before finalizing the note.

## 2017-08-02 NOTE — MR AVS SNAPSHOT
"        Shannan Watkinsnd   2017 9:45 AM   Office Visit   MRN: [id number]    Department:  Rheumatology Med [facility]   Dept Phone:  [phone]    Description:  Female : 1945   Provider:  Indiana Rawls M.D.           Reason for Visit     Follow-Up           Allergies as of 2017     Allergen Noted Reactions    Nkda [No Known Drug Allergy] 2009       Other Environmental 2013   Runny Nose    Runny nose, seasonal hayfever      You were diagnosed with     Seropositive rheumatoid arthritis (CMS-HCC)   [952028]       Osteopenia of multiple sites   [7368387]       On methotrexate therapy   [8210640]       Pain of left hip joint   [7962242]       Muscle spasm   [708024]       Carpal tunnel syndrome of left wrist   [428982]       Lumbar disc disease   [898015]       Hypothyroidism, unspecified type   [3440368]       Essential hypertension   [5382996]         Vital Signs     Blood Pressure Pulse Temperature Respirations Height Weight    110/62 mmHg 55 36.6 °C (97.9 °F) 16 1.676 m (5' 5.98\") 96.435 kg (212 lb 9.6 oz)    Body Mass Index Oxygen Saturation Smoking Status             34.33 kg/m2 96% Current Every Day Smoker         Basic Information     Date Of Birth Sex Race Ethnicity Preferred Language    1945 Female White Non- English      Your appointments     2017  9:45 AM   Follow Up Visit with Indiana Rawls M.D.   [facility] (--)    [address] [place] [address], [address] [address] [phone]           You will be receiving a confirmation call a few days before your appointment from our automated call confirmation system.              Problem List              ICD-10-CM Priority Class Noted - Resolved    Seropositive rheumatoid arthritis (CMS-HCC) M05.9   2014 - Present    Osteoarthritis M19.90   2014 - Present    Overweight E66.3   2014 - Present    Hypertension I10   2014 - Present    Seasonal allergies J30.2   2014 - " Present    S/P shoulder surgery Z98.890   2/20/2014 - Present    Fracture of 2nd metatarsal S92.323A   2/20/2014 - Present    Cataracts, both eyes H26.9   2/20/2014 - Present    Menopause Z78.0   2/20/2014 - Present    Hepatitis A B15.9   2/20/2014 - Present    S/P total knee replacement Z96.659   2/20/2014 - Present    Lumbar disc disease M51.9   2/20/2014 - Present    Hypothyroid E03.9   2/20/2014 - Present    H/O arthroscopy of shoulder Z98.890   Unknown - Present    Carpal tunnel syndrome of left wrist G56.02   8/4/2016 - Present      Health Maintenance        Date Due Completion Dates    IMM DTaP/Tdap/Td Vaccine (1 - Tdap) 12/16/1964 ---    PAP SMEAR 12/16/1966 ---    COLONOSCOPY 12/16/1995 ---    IMM ZOSTER VACCINE 12/16/2005 ---    IMM PNEUMOCOCCAL 65+ (ADULT) LOW/MEDIUM RISK SERIES (2 of 2 - PCV13) 11/1/2013 11/1/2012    MAMMOGRAM 7/20/2017 7/20/2016, 2/26/2015, 10/16/2013, 1/27/2012, 4/8/2010, 4/8/2010, 5/14/2007, 5/14/2007, 3/18/2005    IMM INFLUENZA (1) 9/1/2017 11/1/2012    BONE DENSITY 8/25/2021 8/25/2016, 1/27/2012, 5/14/2007            Current Immunizations     Influenza TIV (IM) 11/1/2012    Pneumococcal polysaccharide vaccine (PPSV-23) 11/1/2012    Tuberculin Skin Test 9/2/2014  2:16 PM, 8/25/2014 12:15 PM      Below and/or attached are the medications your provider expects you to take. Review all of your home medications and newly ordered medications with your provider and/or pharmacist. Follow medication instructions as directed by your provider and/or pharmacist. Please keep your medication list with you and share with your provider. Update the information when medications are discontinued, doses are changed, or new medications (including over-the-counter products) are added; and carry medication information at all times in the event of emergency situations     Allergies:  NKDA - (reactions not documented)     OTHER ENVIRONMENTAL - Runny Nose               Medications  Valid as of: August 02,  2017 - 10:33 AM    Generic Name Brand Name Tablet Size Instructions for use    Amoxicillin (Cap) AMOXIL 500 MG Take 500 mg by mouth 3 times a day.        Aspirin (Tablet Delayed Response) ECOTRIN 81 MG Take 81 mg by mouth every day.        Benzonatate (Cap) TESSALON 100 MG Take 1 Cap by mouth 3 times a day as needed for Cough.        Cholecalciferol (Tab) cholecalciferol 1000 UNIT Take 4,000 Units by mouth every day.        Cimetidine   Take  by mouth.        Folic Acid (Tab) FOLVITE 1 MG TAKE ONE TABLET BY MOUTH ONCE DAILY        Gabapentin (Cap) NEURONTIN 100 MG Take  by mouth every evening.        Guaifenesin-Codeine (Solution) ROBITUSSIN -10 mg/5mL Take 10 mL by mouth every 6 hours as needed for Cough.        Hydrocod Polst-Chlorphen Polst (Suspension Extended Release) TUSSIONEX 10-8 MG/5ML Take 5 mL by mouth every 12 hours.        Hydrocodone-Acetaminophen (Tab) NORCO  MG Take 1-2 Tabs by mouth every 8 hours.        Ibuprofen (Tab) MOTRIN 800 MG Take 800 mg by mouth every 8 hours as needed.        InFLIXimab   by Intravenous route.        Levothyroxine Sodium (Tab) SYNTHROID 125 MCG Take  by mouth every day.        Lisinopril (Tab) PRINIVIL 20 MG Take  by mouth.Daily pm        Magnesium Hydroxide   Take  by mouth.        Methotrexate Sodium (Tab) methotrexate 2.5 MG TAKE SIX TABLETS BY MOUTH ONCE A WEEK        Oxymetazoline HCl (Solution) AFRIN 0.05 % Spray 2 Sprays in nose 2 times a day. Do not use greater than 3-5 rob   Indications: Stuffy Nose        Oyster Shell (Tab) OS-PILO 500 500 MG Take 1,000 mg by mouth 2 times a day, with meals.        Simvastatin (Tab) ZOCOR 20 MG Take 20 mg by mouth every evening.        TiZANidine HCl (Tab) ZANAFLEX 4 MG 1 tab po qhs prn muscle spasm        .                 Medicines prescribed today were sent to:     [facility]  [place] - [address]    [address] [place] [address]    Phone: [phone] Fax: [phone]    Open 24  Hours?: No      Medication refill instructions:       If your prescription bottle indicates you have medication refills left, it is not necessary to call your provider’s office. Please contact your pharmacy and they will refill your medication.    If your prescription bottle indicates you do not have any refills left, you may request refills at any time through one of the following ways: The online [employer] system (except Urgent Care), by calling your provider’s office, or by asking your pharmacy to contact your provider’s office with a refill request. Medication refills are processed only during regular business hours and may not be available until the next business day. Your provider may request additional information or to have a follow-up visit with you prior to refilling your medication.   *Please Note: Medication refills are assigned a new Rx number when refilled electronically. Your pharmacy may indicate that no refills were authorized even though a new prescription for the same medication is available at the pharmacy. Please request the medicine by name with the pharmacy before contacting your provider for a refill.        Your To Do List     Future Labs/Procedures Complete By Expires    CBC WITH DIFFERENTIAL  As directed 8/2/2018    COMP METABOLIC PANEL  As directed 8/2/2018    DX-HIP-UNILATERAL-W/O PELVIS-2/3 VIEWS LEFT  As directed 8/2/2018    WESTERGREN SED RATE  As directed 8/2/2018         [facility] Status: Patient Declined        Quit Tobacco Information     Do you want to quit using tobacco?    Quitting tobacco decreases risks of cancer, heart and lung disease, increases life expectancy, improves sense of taste and smell, and increases spending money, among other benefits.    If you are thinking about quitting, we can help.  • [facility] Quit Tobacco Program: 602-202-1305  o Program occurs weekly for four weeks and includes pharmacist consultation on products to support quitting smoking or chewing tobacco.  A provider referral is needed for pharmacist consultation.  • Tobacco Users Help Hotline: 0-420-QUIT-NOW (773-7520) or https://[place].quitlogix.org/  o Free, confidential telephone and online coaching for [place] residents. Sessions are designed on a schedule that is convenient for you. Eligible clients receive free nicotine replacement therapy.  • Nationally: www.smokefree.gov  o Information and professional assistance to support both immediate and long-term needs as you become, and remain, a non-smoker. Smokefree.gov allows you to choose the help that best fits your needs.
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
## 2017-08-02 NOTE — Clinical Note
[facility]   [address], [address]  [place] [address]  Phone: [phone]  Fax: [phone]              Encounter Date: 8/2/2017    Dear Dr. Dixon ref. provider found,    It was a pleasure seeing your patient, Shannan Andres, on 8/2/2017. Diagnoses of Seropositive rheumatoid arthritis (CMS-HCC), Osteopenia of multiple sites, On methotrexate therapy, Pain of left hip joint, Muscle spasm, Carpal tunnel syndrome of left wrist, Lumbar disc disease, Hypothyroidism, unspecified type, and Essential hypertension were pertinent to this visit.     Please find attached progress note which includes the history I obtained from Ms. Andres, my physical examination findings, my impression and recommendations.      Once again, it was a pleasure participating in your patient's care.  Please feel free to contact me if you have any questions or if I can be of any further assistance to your patients.      Sincerely,    Indiana Rawls M.D.  Electronically Signed          PROGRESS NOTE:  Chief Complaint- joint pain    Subjective:   Shannan Andres is a 71 y.o. female here today for follow up of rheumatological issues    This is a follow-up patient to this clinic,  previously followed by Dr Magallanes who is no longer in the clinic for rheumatoid arthritis, currently on Remicade infusions 300 mg every 8 weeks also on methotrexate 7.5 mg by mouth every week with folic acid 1 mg by mouth daily. Patient is currently off of the Remicade because of hematuria of unknown etiology, awaiting the results of her repeat urine test to assure no infection. Also of note, patient's  just recently passed away about a month ago and is grieving, is planning on probably moving to [place] within the year as this is where her daughter and grandson live. Patient denies any new neurological symptoms, denies any fevers of unknown etiology, denies any unexplained weight loss, denies any new rashes. Patient  continues to have intermittent problems with left carpal tunnel, status post cortisone shot without benefit, uses splints and has not wanted to pursue surgical intervention. Patient also complained of night cramping in her legs and also pain down into her left groin. Denies any trauma to her hip.      S/p Cimzia 12/12/12  D/Cd 09/05/2013      Bilateral TKA    Hand X-rays done 5/2008-indicate periarticular osteopenia  Feet X-rays done 5/2008-indicate periarticular osteopenia  DEXA 5/2007 T scores 1.0, 0.1  DEXA 1/2012 T scores 1.5, -0.1  DEXA 8/2016 T scores -0.6, -1.7  PPD neg 8/25/2014 Ren[facility]otky; Quantiferon Gold neg 9/2016  Hep B neg 4/2016 [facility]; Hep B neg 8/2016; Hep B neg 8/2016       Current medicines (including changes today)  Current Outpatient Prescriptions   Medication Sig Dispense Refill   • InFLIXimab (REMICADE IV) by Intravenous route.     • tizanidine (ZANAFLEX) 4 MG Tab 1 tab po qhs prn muscle spasm 30 Tab 2   • methotrexate 2.5 MG Tab TAKE SIX TABLETS BY MOUTH ONCE A WEEK 84 Tab 0   • Magnesium Hydroxide (MILK OF MAGNESIA PO) Take  by mouth.     • calcium carbonate (CALCIUM CARBONATE) 500 MG Tab Take 1,000 mg by mouth 2 times a day, with meals.     • ibuprofen (MOTRIN) 800 MG Tab Take 800 mg by mouth every 8 hours as needed.     • folic acid (FOLVITE) 1 MG Tab TAKE ONE TABLET BY MOUTH ONCE DAILY 90 Tab 0   • hydrocodone/acetaminophen (NORCO)  MG Tab Take 1-2 Tabs by mouth every 8 hours.     • gabapentin (NEURONTIN) 100 MG CAPS Take  by mouth every evening.     • simvastatin (ZOCOR) 20 MG TABS Take 20 mg by mouth every evening.     • aspirin EC (ECOTRIN) 81 MG TBEC Take 81 mg by mouth every day.     • Cimetidine (EQ ACID REDUCER PO) Take  by mouth.     • vitamin D (CHOLECALCIFEROL) 1000 UNIT TABS Take 4,000 Units by mouth every day.     • LEVOTHYROXINE 125 MCG TABS Take  by mouth every day.     • LISINOPRIL 20 MG TABS Take  by mouth.Daily pm     • guaifenesin-codeine (ROBITUSSIN  "AC) Solution oral solution Take 10 mL by mouth every 6 hours as needed for Cough. 240 mL 0   • Hydrocod Polst-CPM Polst ER (TUSSIONEX) 10-8 MG/5ML Suspension Extended Release Take 5 mL by mouth every 12 hours. 140 mL 0   • benzonatate (TESSALON) 100 MG Cap Take 1 Cap by mouth 3 times a day as needed for Cough. 60 Cap 0   • amoxicillin (AMOXIL) 500 MG Cap Take 500 mg by mouth 3 times a day.     • oxymetazoline (AFRIN) 0.05 % SOLN Spray 2 Sprays in nose 2 times a day. Do not use greater than 3-5 rob   Indications: Stuffy Nose       No current facility-administered medications for this visit.     She  has a past medical history of Heart burn; Pain; Hypertension; Hepatitis A (1974); Indigestion; Arthritis; Cold; Unspecified disorder of thyroid; CATARACT; Seasonal rhinitis; H/O arthroscopy of shoulder; H/O hepatitis; H/O foot surgery; and Foot fracture, right.    ROS   Other than what is mentioned in HPI or physical exam, there is no history of headaches, double vision or blurred vision. No temporal tenderness or jaw claudication. No history of cataracts or glaucoma. No trouble swallowing difficulties or sore throats. No history of thyroid disease. No chest complaints including chest pain, cough or sputum production. No shortness of breath. No GI complaints including nausea, vomiting, change in bowel habits, or past peptic ulcer disease. No history of blood in the stools. No urinary complaints including dysuria or frequency. No history of rash including psoriasis. No history of alopecia, photosensitivity, oral ulcerations, Raynaud's phenomena, or swollen joints. No history of gout. No back complaints. No history of low blood counts.       Objective:     Blood pressure 110/62, pulse 55, temperature 36.6 °C (97.9 °F), resp. rate 16, height 1.676 m (5' 5.98\"), weight 96.435 kg (212 lb 9.6 oz), SpO2 96 %. Body mass index is 34.33 kg/(m^2).   Physical Exam:  GENERAL: Overweight.  Alert and oriented x 3, No apparent distress. " SKIN: No inflammation, normal turgor, no rashes. HEENT: Atraumatic, unremarkable. PERRLA, extraocular movements are intact. Conjunctiva clear. Fundi not examined. Temporal arteries are palpable and non-tender. THROAT: Clear. NECK: Supple with good range of motion including flexion, extension, lateral rotation and bending. No JVD, thyromegaly or adenopathy is appreciated. Carotids are palpable, no bruits. CHEST: Clear to ausculation and percussion bilaterally, no rales or rhonchi. Respiratory efforts good. BREASTS: Not examined. COR: Regular rate and rhythm. No murmurs, rubs or gallops. ABDOMEN: Soft, non-tender, non-distended. Normal active bowel sounds. No organomegaly appreciated. No hepatomegaly. EXTREMITIES: No clubbing, cyanosis, edema. MUSCULOSKELETAL: Both shoulders have full range of motion including internal and external rotation and abduction. Both elbows have full range of motion without  active synovitis. The wrists have good range of motion without limitations. The small joints of the hands are unremarkable without significant swelling or tenderness. There is some mild ulnar deviation of both hands, there is also evidence of ulnar styloid prominence but without synovitis, Do not appreciate any thenar or hyperthenar eminence atrophy on the left There are no tophi or nodules appreciated. The hips, knees, ankles and feet all have good range of motion. Low back is normal. There is no SI tenderness to compression or palpation. There is good lumbar flexion. Patient has hammertoes both feet but no mahesh excoriations or open wounds. RECTAL: Not done. : Not done. NEUROLOGICAL: Grossly intact. Motor and sensory examinations are normal. PULSES. Intact  Lab Results   Component Value Date/Time    QUANTIFERON TB GOLD Negative 09/28/2016 10:27 AM     Lab Results   Component Value Date/Time    HEPATITIS B SURFACE ANTIGEN Negative 08/01/2016 11:31 AM     Lab Results   Component Value Date/Time    SODIUM 139 04/05/2017  09:49 AM    POTASSIUM 4.5 04/05/2017 09:49 AM    CHLORIDE 104 04/05/2017 09:49 AM    CO2 27 04/05/2017 09:49 AM    GLUCOSE 97 04/05/2017 09:49 AM    BUN 17 04/05/2017 09:49 AM    CREATININE 0.81 04/05/2017 09:49 AM      Lab Results   Component Value Date/Time    WBC 6.5 04/05/2017 09:49 AM    RBC 3.86* 04/05/2017 09:49 AM    HEMOGLOBIN 12.5 04/05/2017 09:49 AM    HEMATOCRIT 37.3 04/05/2017 09:49 AM    MCV 96.6 04/05/2017 09:49 AM    MCH 32.4 04/05/2017 09:49 AM    MCHC 33.5* 04/05/2017 09:49 AM    MPV 12.3 04/05/2017 09:49 AM    NEUTROPHILS-POLYS 31.10* 04/05/2017 09:49 AM    LYMPHOCYTES 56.30* 04/05/2017 09:49 AM    MONOCYTES 7.40 04/05/2017 09:49 AM    EOSINOPHILS 4.10 04/05/2017 09:49 AM    BASOPHILS 1.10 04/05/2017 09:49 AM      Lab Results   Component Value Date/Time    CALCIUM 8.9 04/05/2017 09:49 AM    AST(SGOT) 26 04/05/2017 09:49 AM    ALT(SGPT) 16 04/05/2017 09:49 AM    ALKALINE PHOSPHATASE 53 04/05/2017 09:49 AM    TOTAL BILIRUBIN 0.6 04/05/2017 09:49 AM    ALBUMIN 3.8 04/05/2017 09:49 AM    TOTAL PROTEIN 6.4 04/05/2017 09:49 AM     Lab Results   Component Value Date/Time    SED RATE WESTERGREN 5 04/05/2017 09:49 AM     Results for orders placed during the hospital encounter of 05/27/08   DX-JOINT SURVEY-HANDS SINGLE VIEW    Impression IMPRESSION:     OSTEOARTHRITIC TYPE CHANGES INVOLVING THE DISTAL INTERPHALANGEAL JOINTS   WITH SUGGESTION OF PERIARTICULAR OSTEOPOROSIS AND SUBLUXATION AT THE LEFT   THIRD METACARPOPHALANGEAL JOINT MAY INDICATE RHEUMATOID ARTHRITIS.      KALYAN/holly     Read By DANNY CLEMENS MD on May 27 2008  4:34PM  : HOLLY Transcription Date: May 28 2008  7:47PM  THIS DOCUMENT HAS BEEN ELECTRONICALLY SIGNED BY: DANNY CLEMENS MD on   May 28 2008  8:02PM        Results for orders placed during the hospital encounter of 05/27/08   DX-JOINT SURVEY-FEET SINGLE VIEW    Impression IMPRESSION:     1. OSTEOARTHRITIC CHANGE INVOLVING THE FIRST METATARSOPHALANGEAL JOINT.      2.    SUGGESTION OF PERIARTICULAR OSTEOPOROSIS MAY INDICATE RHEUMATOID   ARTHRITIS.         Results for orders placed during the hospital encounter of 08/25/16   DS-BONE DENSITY STUDY (DEXA)    Impression According to the World Health Organization classification, bone mineral density of this patient is osteopenic for the left proximal femur and normal for the distal left forearm.        10-year Probability of Fracture:  Major Osteoporotic     31.8%  Hip     14.0%  Population      [place] ()    Based on left femur neck BMD          INTERPRETING LOCATION:  [address] [place] [address], [id number]     Results for orders placed during the hospital encounter of 03/07/13   DX-KNEES-AP BILATERAL STANDING    Impression Severe right osteoarthritis with lateral tibial subluxation    Left knee arthroplasty without evidence of loosening     Results for orders placed during the hospital encounter of 03/07/13   DX-KNEES-AP BILATERAL STANDING    Impression Severe right osteoarthritis with lateral tibial subluxation    Left knee arthroplasty without evidence of loosening     Results for orders placed during the hospital encounter of 04/10/08   DX-KNEE COMPLETE 4+    Impression IMPRESSION:     MODERATE TO SEVERE TRICOMPARTMENTAL OSTEOARTHRITIS.          Results for orders placed during the hospital encounter of 08/25/09   DX-KNEE 2-    Impression IMPRESSION:     POSTSURGICAL CHANGES SEEN CONSISTENT WITH TRICOMPARTMENT ARTHROPLASTY.     MATTHEW/nilton       Read By DOLLY JOSHUA MD on Aug 25 2009 11:36AM  : ZPT Transcription Date: Aug 25 2009 12:12PM  THIS DOCUMENT HAS BEEN ELECTRONICALLY SIGNED BY: DOLLY JOSHUA MD on Aug   25 2009  3:37PM        Results for orders placed during the hospital encounter of 04/10/08   DX-SHOULDER 2+    Impression IMPRESSION:     STABLE RIGHT SHOULDER WITH DEGENERATIVE CHANGES IN THE INFERIOR   GLENOHUMERAL JOINT AND PROBABLE POSTOPERATIVE CHANGES AT THE AC JOINT.     OFELIA/nilton     Read By ANDREA REYES  MD ALICIA on Apr 11 2008  8:24AM  : GONZALO Transcription Date: Apr 11 2008  7:43PM  THIS DOCUMENT HAS BEEN ELECTRONICALLY SIGNED BY: ANDREA VARGAS MD on   Apr 14 2008  8:30AM        Results for orders placed during the hospital encounter of 01/07/16   MR-LUMBAR SPINE-W/O    Impression 1.  Status post posterior fusion and decompressive laminectomies from L2-L4.    2.  Multilevel degenerative disc disease and facet arthropathy as described. Findings are similar to the prior exam. No discrete disc herniation or isolated nerve root compression is appreciated.    3.  Stable postoperative seroma at the L3 level.     Results for orders placed during the hospital encounter of 04/03/13   DX-CERVICAL SPINE-2 OR 3 VIEWS    Impression Arthropathy as described above.            INTERPRETING LOCATION:  [place], [address]     Assessment and Plan:     1. Seropositive rheumatoid arthritis (CMS-HCC)  Currently just on methotrexate at 7.5 mg by mouth every week and folic acid 1 mg by mouth daily, currently off of Remicade until hematuria can be resolved.  Next visit check CCP and rheumatoid factor in your calcium level.  - tizanidine (ZANAFLEX) 4 MG Tab; 1 tab po qhs prn muscle spasm  Dispense: 30 Tab; Refill: 2  - DX-HIP-UNILATERAL-W/O PELVIS-2/3 VIEWS LEFT; Future  - methotrexate 2.5 MG Tab; TAKE SIX TABLETS BY MOUTH ONCE A WEEK  Dispense: 84 Tab; Refill: 0  - CBC WITH DIFFERENTIAL; Future  - COMP METABOLIC PANEL; Future  - WESTERGREN SED RATE; Future    2. Osteopenia of multiple sites  Last DEXA August 2016, next DEXA August 2018   continue calcium 1200 mg by mouth daily and vitamin D about 1000 units by mouth daily and magnesium 400 mg by mouth daily  - tizanidine (ZANAFLEX) 4 MG Tab; 1 tab po qhs prn muscle spasm  Dispense: 30 Tab; Refill: 2  - DX-HIP-UNILATERAL-W/O PELVIS-2/3 VIEWS LEFT; Future    3. On methotrexate therapy  Labs to be done every 3 months i.e. CBC, comprehensive panel and  sedimentation rate  - tizanidine (ZANAFLEX) 4 MG Tab; 1 tab po qhs prn muscle spasm  Dispense: 30 Tab; Refill: 2  - DX-HIP-UNILATERAL-W/O PELVIS-2/3 VIEWS LEFT; Future  - CBC WITH DIFFERENTIAL; Future  - COMP METABOLIC PANEL; Future  - WESTERGREN SED RATE; Future    4. Pain of left hip joint  Today we'll do a left hip x-ray for evaluation of chondrocalcinosis first DJD versus erosive arthritis  - tizanidine (ZANAFLEX) 4 MG Tab; 1 tab po qhs prn muscle spasm  Dispense: 30 Tab; Refill: 2  - DX-HIP-UNILATERAL-W/O PELVIS-2/3 VIEWS LEFT; Future    5. Muscle spasm  Do trial tizanidine 4 mg by mouth daily at bedtime when necessary  - tizanidine (ZANAFLEX) 4 MG Tab; 1 tab po qhs prn muscle spasm  Dispense: 30 Tab; Refill: 2  - DX-HIP-UNILATERAL-W/O PELVIS-2/3 VIEWS LEFT; Future    6. Carpal tunnel syndrome of left wrist  Stable, cortisone injections ineffective, patient has a brace she wears at night, does not water pursue surgical intervention at this time  - tizanidine (ZANAFLEX) 4 MG Tab; 1 tab po qhs prn muscle spasm  Dispense: 30 Tab; Refill: 2  - DX-HIP-UNILATERAL-W/O PELVIS-2/3 VIEWS LEFT; Future    7. Lumbar disc disease  - tizanidine (ZANAFLEX) 4 MG Tab; 1 tab po qhs prn muscle spasm  Dispense: 30 Tab; Refill: 2  - DX-HIP-UNILATERAL-W/O PELVIS-2/3 VIEWS LEFT; Future    8. Hypothyroidism, unspecified type  Can exacerbate joint pain, I recommend monitoring thyroid on a regular basis to assure it is not contributing to the patient's joint pain  - tizanidine (ZANAFLEX) 4 MG Tab; 1 tab po qhs prn muscle spasm  Dispense: 30 Tab; Refill: 2  - DX-HIP-UNILATERAL-W/O PELVIS-2/3 VIEWS LEFT; Future    9. Essential hypertension  ***  - tizanidine (ZANAFLEX) 4 MG Tab; 1 tab po qhs prn muscle spasm  Dispense: 30 Tab; Refill: 2  - DX-HIP-UNILATERAL-W/O PELVIS-2/3 VIEWS LEFT; Future    Followup: Return in about 3 months (around 11/2/2017). or sooner prn    Patient was seen 30 minutes face-to-face of which more than 50% of the time  was spent counseling the patient (excluding time for procedures)  regarding  rheumatological condition and care. Therapy was discussed in detail.    Please note that this dictation was created using voice recognition software. I have made every reasonable attempt to correct obvious errors, but I expect that there are errors of grammar and possibly content that I did not discover before finalizing the note.

## 2017-08-02 NOTE — Clinical Note
[facility]   [address], [address]  [place] [address]  Phone: [phone]  Fax: [phone]              Encounter Date: 8/2/2017    Dear Dr. Dixon ref. provider found,    It was a pleasure seeing your patient, Shannan Andres, on 8/2/2017. Diagnoses of Seropositive rheumatoid arthritis (CMS-HCC), Osteopenia of multiple sites, On methotrexate therapy, Pain of left hip joint, Muscle spasm, Carpal tunnel syndrome of left wrist, Lumbar disc disease, Hypothyroidism, unspecified type, and Essential hypertension were pertinent to this visit.     Please find attached progress note which includes the history I obtained from Ms. Andres, my physical examination findings, my impression and recommendations.      Once again, it was a pleasure participating in your patient's care.  Please feel free to contact me if you have any questions or if I can be of any further assistance to your patients.      Sincerely,    Indiana Rawls M.D.  Electronically Signed          PROGRESS NOTE:  No notes on file

## 2017-08-07 DIAGNOSIS — M05.9 SEROPOSITIVE RHEUMATOID ARTHRITIS (HCC): Primary | ICD-10-CM

## 2017-08-07 NOTE — TELEPHONE ENCOUNTER
Please notify patient that we had to temporarily deny methotrexate until she can get her blood tests done that were ordered August 2, 2017.

## 2017-08-07 NOTE — TELEPHONE ENCOUNTER
1. Caller Name: Shannan Andres                      Call Back Number: [phone]    2. Message: Pt would like to have rx changed back to 3 doses per week, as the pharmacy wants to charge her $129 for the 6 doses per week. She does have one weeks left of medication.    3. Patient approves office to leave a detailed voicemail/MyChart message: N/A

## 2017-08-08 ENCOUNTER — HOSPITAL ENCOUNTER (OUTPATIENT)
Dept: LAB | Facility: MEDICAL CENTER | Age: 72
End: 2017-08-08
Attending: INTERNAL MEDICINE
Payer: MEDICARE

## 2017-08-08 DIAGNOSIS — M05.9 SEROPOSITIVE RHEUMATOID ARTHRITIS (HCC): ICD-10-CM

## 2017-08-08 DIAGNOSIS — Z79.631 ON METHOTREXATE THERAPY: ICD-10-CM

## 2017-08-08 LAB
ALBUMIN SERPL BCP-MCNC: 3.8 G/DL (ref 3.2–4.9)
ALBUMIN/GLOB SERPL: 1.4 G/DL
ALP SERPL-CCNC: 57 U/L (ref 30–99)
ALT SERPL-CCNC: 25 U/L (ref 2–50)
ANION GAP SERPL CALC-SCNC: 5 MMOL/L (ref 0–11.9)
AST SERPL-CCNC: 37 U/L (ref 12–45)
BASOPHILS # BLD AUTO: 0.9 % (ref 0–1.8)
BASOPHILS # BLD: 0.07 K/UL (ref 0–0.12)
BILIRUB SERPL-MCNC: 0.4 MG/DL (ref 0.1–1.5)
BUN SERPL-MCNC: 17 MG/DL (ref 8–22)
CALCIUM SERPL-MCNC: 9.6 MG/DL (ref 8.5–10.5)
CHLORIDE SERPL-SCNC: 107 MMOL/L (ref 96–112)
CO2 SERPL-SCNC: 24 MMOL/L (ref 20–33)
CREAT SERPL-MCNC: 0.83 MG/DL (ref 0.5–1.4)
EOSINOPHIL # BLD AUTO: 0.26 K/UL (ref 0–0.51)
EOSINOPHIL NFR BLD: 3.4 % (ref 0–6.9)
ERYTHROCYTE [DISTWIDTH] IN BLOOD BY AUTOMATED COUNT: 54 FL (ref 35.9–50)
ERYTHROCYTE [SEDIMENTATION RATE] IN BLOOD BY WESTERGREN METHOD: 10 MM/HOUR (ref 0–30)
GFR SERPL CREATININE-BSD FRML MDRD: >60 ML/MIN/1.73 M 2
GLOBULIN SER CALC-MCNC: 2.7 G/DL (ref 1.9–3.5)
GLUCOSE SERPL-MCNC: 94 MG/DL (ref 65–99)
HCT VFR BLD AUTO: 37.4 % (ref 37–47)
HGB BLD-MCNC: 12.2 G/DL (ref 12–16)
IMM GRANULOCYTES # BLD AUTO: 0.01 K/UL (ref 0–0.11)
IMM GRANULOCYTES NFR BLD AUTO: 0.1 % (ref 0–0.9)
LYMPHOCYTES # BLD AUTO: 4.14 K/UL (ref 1–4.8)
LYMPHOCYTES NFR BLD: 54 % (ref 22–41)
MCH RBC QN AUTO: 32.3 PG (ref 27–33)
MCHC RBC AUTO-ENTMCNC: 32.6 G/DL (ref 33.6–35)
MCV RBC AUTO: 98.9 FL (ref 81.4–97.8)
MONOCYTES # BLD AUTO: 0.45 K/UL (ref 0–0.85)
MONOCYTES NFR BLD AUTO: 5.9 % (ref 0–13.4)
NEUTROPHILS # BLD AUTO: 2.73 K/UL (ref 2–7.15)
NEUTROPHILS NFR BLD: 35.7 % (ref 44–72)
NRBC # BLD AUTO: 0 K/UL
NRBC BLD AUTO-RTO: 0 /100 WBC
PLATELET # BLD AUTO: 155 K/UL (ref 164–446)
PMV BLD AUTO: 12.7 FL (ref 9–12.9)
POTASSIUM SERPL-SCNC: 4.4 MMOL/L (ref 3.6–5.5)
PROT SERPL-MCNC: 6.5 G/DL (ref 6–8.2)
RBC # BLD AUTO: 3.78 M/UL (ref 4.2–5.4)
SODIUM SERPL-SCNC: 136 MMOL/L (ref 135–145)
WBC # BLD AUTO: 7.7 K/UL (ref 4.8–10.8)

## 2017-08-08 PROCEDURE — 85025 COMPLETE CBC W/AUTO DIFF WBC: CPT

## 2017-08-08 PROCEDURE — 80053 COMPREHEN METABOLIC PANEL: CPT

## 2017-08-08 PROCEDURE — 85652 RBC SED RATE AUTOMATED: CPT

## 2017-08-08 PROCEDURE — 36415 COLL VENOUS BLD VENIPUNCTURE: CPT

## 2017-09-13 ENCOUNTER — OUTPATIENT INFUSION SERVICES (OUTPATIENT)
Dept: ONCOLOGY | Facility: MEDICAL CENTER | Age: 72
End: 2017-09-13
Attending: INTERNAL MEDICINE
Payer: MEDICARE

## 2017-09-13 VITALS
RESPIRATION RATE: 18 BRPM | BODY MASS INDEX: 33.77 KG/M2 | HEART RATE: 65 BPM | DIASTOLIC BLOOD PRESSURE: 67 MMHG | SYSTOLIC BLOOD PRESSURE: 130 MMHG | HEIGHT: 66 IN | TEMPERATURE: 97.5 F | OXYGEN SATURATION: 94 % | WEIGHT: 210.1 LBS

## 2017-09-13 PROCEDURE — 700111 HCHG RX REV CODE 636 W/ 250 OVERRIDE (IP): Performed by: INTERNAL MEDICINE

## 2017-09-13 PROCEDURE — 96413 CHEMO IV INFUSION 1 HR: CPT

## 2017-09-13 PROCEDURE — 700105 HCHG RX REV CODE 258: Performed by: INTERNAL MEDICINE

## 2017-09-13 PROCEDURE — 96415 CHEMO IV INFUSION ADDL HR: CPT

## 2017-09-13 RX ORDER — ACETAMINOPHEN 325 MG/1
650 TABLET ORAL ONCE
Status: DISCONTINUED | OUTPATIENT
Start: 2017-09-13 | End: 2017-09-13 | Stop reason: HOSPADM

## 2017-09-13 RX ADMIN — INFLIXIMAB 300 MG: 100 INJECTION, POWDER, LYOPHILIZED, FOR SOLUTION INTRAVENOUS at 10:17

## 2017-09-13 ASSESSMENT — PAIN SCALES - GENERAL: PAINLEVEL: 8=MODERATE-SEVERE PAIN

## 2017-09-13 NOTE — PROGRESS NOTES
"Patient arrived ambulatory to the [facility] for Remicade. Reviewed labs, vital signs, and physician order. Patient reports feeling \"much better\" than the previous visit, denies S&S of infection. IV access obtained in right forearm, visualized brisk blood return, patient request to hold pre-medications. Medication administered with filter. Patient tolerated infusion, no adverse reaction noted. Patient provided apt for 8 weeks per MD order, states that \"I'm planning on moving out of the state around October 14 to where my daughter lives, I'll make my next apt, and cancel the apt if I leave before then\". Patient left the [facility] ambulatory with front wheel walker in no signs of distress.   "

## 2017-09-14 ENCOUNTER — OFFICE VISIT (OUTPATIENT)
Dept: URGENT CARE | Facility: CLINIC | Age: 72
End: 2017-09-14
Payer: MEDICARE

## 2017-09-14 VITALS
TEMPERATURE: 97.2 F | DIASTOLIC BLOOD PRESSURE: 70 MMHG | HEIGHT: 68 IN | RESPIRATION RATE: 18 BRPM | WEIGHT: 210 LBS | OXYGEN SATURATION: 96 % | SYSTOLIC BLOOD PRESSURE: 120 MMHG | BODY MASS INDEX: 31.83 KG/M2 | HEART RATE: 72 BPM

## 2017-09-14 DIAGNOSIS — M62.838 CERVICAL PARASPINAL MUSCLE SPASM: ICD-10-CM

## 2017-09-14 DIAGNOSIS — M54.2 CERVICAL PAIN (NECK): ICD-10-CM

## 2017-09-14 PROCEDURE — 99214 OFFICE O/P EST MOD 30 MIN: CPT | Performed by: NURSE PRACTITIONER

## 2017-09-14 RX ORDER — CYCLOBENZAPRINE HCL 5 MG
5-10 TABLET ORAL 3 TIMES DAILY PRN
Qty: 30 TAB | Refills: 0 | Status: SHIPPED | OUTPATIENT
Start: 2017-09-14 | End: 2018-09-21

## 2017-09-14 ASSESSMENT — ENCOUNTER SYMPTOMS
NECK PAIN: 1
PAIN: 1

## 2017-09-14 ASSESSMENT — PAIN SCALES - GENERAL: PAINLEVEL: 8=MODERATE-SEVERE PAIN

## 2017-09-14 NOTE — PROGRESS NOTES
Subjective:      Shannan Andres is a 71 y.o. female who presents with Pain (down L side of neck up head around the ear and into face, thinking shingles but no rash )            Pain   This is a new problem. Episode onset: She reports an onset of left sided neck pain 3 days ago. She feels like the muscle hurts and it is uncomfortable over to her left ear and into her jaw joint. Episode frequency: She reports that the pain is better in the morning but gets worse as the day goes on. Associated symptoms include neck pain. Associated symptoms comments: Reports she has had shingles in the past but on the right side of her neck about 10 years ago. States she has been very stressed recently with the death of her  and now she is packing her house to move to [place]. The symptoms are aggravated by twisting. She has tried NSAIDs, ice and heat for the symptoms. The treatment provided no relief.       Review of Systems   Musculoskeletal: Positive for neck pain.   All other systems reviewed and are negative.    Past Medical History:   Diagnosis Date   • Hepatitis A 1974   • Arthritis     RA, right knee   • CATARACT     small, no surgery yet   • Cold     January   • Foot fracture, right    • H/O arthroscopy of shoulder     right    • H/O foot surgery     Right    • H/O hepatitis    • Heart burn    • Hypertension    • Indigestion    • Pain     shoulders,wrists,fingers,pain scale 4-5   • Seasonal rhinitis     occassional , no rx   • Unspecified disorder of thyroid     hypothyroid      Past Surgical History:   Procedure Laterality Date   • LUMBAR FUSION POSTERIOR  5/2/2013    Performed by Will Hallman M.D. at SURGERY [facility] ORS   • LUMBAR DECOMPRESSION  5/2/2013    Performed by Will Hallman M.D. at SURGERY [facility] ORS   • PB INJ DX/THER AGNT PARAVERT FACET JOINT, NILESH*  10/13/2011    Performed by LACIE GALAN at SURGERY [facility] ORS   • PB INJ DX/THER AGNT PARAVERT FACET JOINT, CE*  10/13/2011     "Performed by LACIE GALAN at SURGERY [facility]S   • IRRIGATION & DEBRIDEMENT ORTHO  1/21/2010    Performed by COTY VANESSA at SURGERY [facility] ORS   • KNEE ARTHROPLASTY TOTAL  8/25/2009    Performed by COTY VANESSA at SURGERY [facility] ORS   • SHOULDER ARTHROSCOPY  2006    right   • FOOT ORIF  1996    right   • KNEE ARTHROSCOPY  1998,2000    right,left      Social History     Social History   • Marital status:      Spouse name: N/A   • Number of children: N/A   • Years of education: N/A     Occupational History   • Not on file.     Social History Main Topics   • Smoking status: Current Every Day Smoker     Packs/day: 0.75     Years: 30.00     Types: Cigarettes   • Smokeless tobacco: Never Used      Comment: 1ppd   • Alcohol use 0.0 oz/week      Comment: occasionally; also uses e-cigarettes   • Drug use: No   • Sexual activity: Not on file     Other Topics Concern   • Not on file     Social History Narrative   • No narrative on file          Objective:     /70   Pulse 72   Temp 36.2 °C (97.2 °F)   Resp 18   Ht 1.727 m (5' 8\")   Wt 95.3 kg (210 lb)   SpO2 96%   Breastfeeding? No   BMI 31.93 kg/m²      Physical Exam   Constitutional: She is oriented to person, place, and time. Vital signs are normal. She appears well-developed and well-nourished.   HENT:   Head: Normocephalic and atraumatic.   Eyes: EOM are normal. Pupils are equal, round, and reactive to light.   Neck: Trachea normal, normal range of motion and phonation normal. Muscular tenderness present. No spinous process tenderness present. No neck rigidity.       Cardiovascular: Normal rate and regular rhythm.    Pulmonary/Chest: Effort normal.   Abdominal: Soft.   Musculoskeletal: Normal range of motion.   Neurological: She is alert and oriented to person, place, and time. She has normal strength. No cranial nerve deficit or sensory deficit.   Skin: Skin is warm and dry. Capillary refill takes less than 2 seconds. "   Psychiatric: She has a normal mood and affect. Her speech is normal and behavior is normal. Thought content normal.   Vitals reviewed.              Assessment/Plan:     1. Cervical pain (neck)    2. Cervical paraspinal muscle spasm  - cyclobenzaprine (FLEXERIL) 5 MG tablet; Take 1-2 Tabs by mouth 3 times a day as needed for Muscle Spasms.  Dispense: 30 Tab; Refill: 0    Discussed with patient to continue with warm and ice compresses  She takes Ibuprofen daily for her RA  Sedating effects of flexeril discussed  If a rash develops and presents like shingles I would like her to contact me, she V/U  Supportive care, differential diagnoses, and indications for immediate follow-up discussed with patient.    Pathogenesis of diagnosis discussed including typical length and natural progression.      Instructed to return to  or nearest emergency department if symptoms fail to improve, for any change in condition, further concerns, or new concerning symptoms.  Patient states understanding of the plan of care and discharge instructions.

## 2017-11-08 ENCOUNTER — APPOINTMENT (OUTPATIENT)
Dept: ONCOLOGY | Facility: MEDICAL CENTER | Age: 72
End: 2017-11-08
Attending: INTERNAL MEDICINE
Payer: MEDICARE

## 2018-09-21 ENCOUNTER — OFFICE VISIT (OUTPATIENT)
Dept: URGENT CARE | Facility: CLINIC | Age: 73
End: 2018-09-21
Payer: MEDICARE

## 2018-09-21 VITALS
BODY MASS INDEX: 31.64 KG/M2 | DIASTOLIC BLOOD PRESSURE: 60 MMHG | HEART RATE: 64 BPM | WEIGHT: 221 LBS | OXYGEN SATURATION: 96 % | RESPIRATION RATE: 16 BRPM | SYSTOLIC BLOOD PRESSURE: 110 MMHG | HEIGHT: 70 IN | TEMPERATURE: 97.4 F

## 2018-09-21 DIAGNOSIS — H81.10 BENIGN PAROXYSMAL POSITIONAL VERTIGO, UNSPECIFIED LATERALITY: ICD-10-CM

## 2018-09-21 DIAGNOSIS — R09.81 NASAL CONGESTION: ICD-10-CM

## 2018-09-21 PROCEDURE — 99213 OFFICE O/P EST LOW 20 MIN: CPT | Performed by: NURSE PRACTITIONER

## 2018-09-21 RX ORDER — MELOXICAM 15 MG/1
15 TABLET ORAL DAILY
COMMUNITY

## 2018-09-21 RX ORDER — MECLIZINE HCL 12.5 MG/1
12.5 TABLET ORAL 3 TIMES DAILY PRN
Qty: 20 TAB | Refills: 0 | Status: SHIPPED | OUTPATIENT
Start: 2018-09-21

## 2018-09-21 RX ORDER — FLUTICASONE PROPIONATE 50 MCG
1 SPRAY, SUSPENSION (ML) NASAL 2 TIMES DAILY
Qty: 16 G | Refills: 0 | Status: SHIPPED | OUTPATIENT
Start: 2018-09-21

## 2018-09-21 NOTE — PROGRESS NOTES
"Chief Complaint   Patient presents with   • Dizziness     possible vertigo,started yesterday\"The room is spinning\"episode of vomiting       HISTORY OF PRESENT ILLNESS: Patient is a 72 y.o. female who presents to urgent care today with complaints of dizziness. States that for the past week she has had three days of feeling dizzy senstation. The  Dizziness is described as \"the room is spinning,\" is exacerbated by movement of her head, improves with stillness and rest. She admits to nausea with vomiting during the first episode but has improved since. She denies headache, fatigue, chest pain, neck pain, numbness, tingling, weakness, confusion, droop. She does admit to recent ear pressure and chronic congestion. She uses Afrin for her congestion. She has tried using OTC dramamine with improvement in her symptoms. She admits to a history of vertigo and this feels similar.       Patient Active Problem List    Diagnosis Date Noted   • Carpal tunnel syndrome of left wrist 08/04/2016   • H/O arthroscopy of shoulder    • S/P shoulder surgery 02/20/2014   • Fracture of 2nd metatarsal 02/20/2014   • Cataracts, both eyes 02/20/2014   • Menopause 02/20/2014   • Hepatitis A 02/20/2014   • S/P total knee replacement 02/20/2014   • Lumbar disc disease 02/20/2014   • Hypothyroid 02/20/2014   • Seropositive rheumatoid arthritis (HCC) 02/19/2014   • Osteoarthritis 02/19/2014   • Overweight 02/19/2014   • Hypertension 02/19/2014   • Seasonal allergies 02/19/2014       Allergies:Nkda [no known drug allergy] and Other environmental    Current Outpatient Prescriptions Ordered in [facility]   Medication Sig Dispense Refill   • meloxicam (MOBIC) 15 MG tablet Take 15 mg by mouth every day.     • simvastatin (ZOCOR) 20 MG TABS Take 20 mg by mouth every evening.     • LEVOTHYROXINE 125 MCG TABS Take  by mouth every day.     • InFLIXimab (REMICADE IV) by Intravenous route.     • methotrexate 2.5 MG Tab TAKE SIX TABLETS BY MOUTH ONCE A WEEK 84 Tab 0 "   • Magnesium Hydroxide (MILK OF MAGNESIA PO) Take  by mouth.     • calcium carbonate (CALCIUM CARBONATE) 500 MG Tab Take 1,000 mg by mouth 2 times a day, with meals.     • ibuprofen (MOTRIN) 800 MG Tab Take 800 mg by mouth every 8 hours as needed.     • hydrocodone/acetaminophen (NORCO)  MG Tab Take 1-2 Tabs by mouth every 8 hours.     • gabapentin (NEURONTIN) 100 MG CAPS Take  by mouth every evening.     • Cimetidine (EQ ACID REDUCER PO) Take  by mouth.     • vitamin D (CHOLECALCIFEROL) 1000 UNIT TABS Take 4,000 Units by mouth every day.     • oxymetazoline (AFRIN) 0.05 % SOLN Spray 2 Sprays in nose 2 times a day. Do not use greater than 3-5 rob   Indications: Stuffy Nose     • LISINOPRIL 20 MG TABS Take  by mouth.Daily pm       No current Epic-ordered facility-administered medications on file.        Past Medical History:   Diagnosis Date   • Arthritis     RA, right knee   • CATARACT     small, no surgery yet   • Cold     January   • Foot fracture, right    • H/O arthroscopy of shoulder     right    • H/O foot surgery     Right    • H/O hepatitis    • Heart burn    • Hepatitis A 1974   • Hypertension    • Indigestion    • Pain     shoulders,wrists,fingers,pain scale 4-5   • Seasonal rhinitis     occassional , no rx   • Unspecified disorder of thyroid     hypothyroid       Social History   Substance Use Topics   • Smoking status: Current Every Day Smoker     Packs/day: 0.75     Years: 30.00     Types: Cigarettes   • Smokeless tobacco: Never Used      Comment: 1ppd   • Alcohol use 0.0 oz/week      Comment: occasionally; also uses e-cigarettes       No family status information on file.     Family History   Problem Relation Age of Onset   • Cancer Maternal Aunt         breast       ROS:  Review of Systems   Constitutional: Negative for fever, chills, weight loss, malaise, and fatigue.   HENT: Positive for ear pressure, nasal congestion. Negative for ear pain, nosebleeds, sore throat and neck pain.    Eyes:  "Negative for vision changes.   Neuro: Positive for vertigo sensation. Negative for headache, sensory changes, weakness, seizure, LOC.   Cardiovascular: Negative for chest pain, palpitations, orthopnea and leg swelling.   Respiratory: Negative for cough, sputum production, shortness of breath and wheezing.   Gastrointestinal: Negative for abdominal pain, nausea, vomiting or diarrhea.   Genitourinary: Negative for dysuria, urgency and frequency.  Musculoskeletal: Negative for falls, neck pain, back pain, joint pain, myalgias.   Skin: Negative for rash, diaphoresis.     Exam:  Blood pressure 110/60, pulse 64, temperature 36.3 °C (97.4 °F), temperature source Temporal, resp. rate 16, height 1.778 m (5' 10\"), weight 100.2 kg (221 lb), SpO2 96 %, not currently breastfeeding.  General: well-nourished, well-developed female in NAD  Head: normocephalic, atraumatic  Eyes: PERRLA, no conjunctival injection, acuity grossly intact, lids normal.  Ears: normal shape and symmetry, no tenderness, no discharge. External canals are without any significant edema or erythema. Tympanic membranes are without any inflammation, no effusion. Gross auditory acuity is intact.  Nose: symmetrical without tenderness, clear discharge.  Mouth/Throat: reasonable hygiene, no erythema, exudates or tonsillar enlargement. PND noted.   Neck: no masses, range of motion within normal limits, no tracheal deviation. No obvious thyroid enlargement.   Lymph: no cervical adenopathy. No supraclavicular adenopathy.   Neuro: alert and oriented. Cranial nerves 1-12 grossly intact. No sensory deficit. Symptoms reproduced with head movement.   Cardiovascular: regular rate and rhythm. No edema.  Pulmonary: no distress. Chest is symmetrical with respiration, no wheezes, crackles, or rhonchi.   Musculoskeletal: no clubbing, appropriate muscle tone, gait is stable.  Skin: warm, dry, intact, no clubbing, no cyanosis, no rashes.   Psych: appropriate mood, affect, " judgement.         Assessment/Plan:  1. Benign paroxysmal positional vertigo, unspecified laterality  fluticasone (FLONASE) 50 MCG/ACT nasal spray    meclizine (ANTIVERT) 12.5 MG Tab   2. Nasal congestion  fluticasone (FLONASE) 50 MCG/ACT nasal spray         Flonase and Meclizine as directed for suspect BPPV. Instructed to DC Afrin.   Supportive care, differential diagnoses, and indications for immediate follow-up discussed with patient.   Pathogenesis of diagnosis discussed including typical length and natural progression.   Instructed to return to clinic or nearest emergency department for any change in condition, further concerns, or worsening of symptoms.  Patient states understanding of the plan of care and discharge instructions.  Instructed to make an appointment, for follow up, with her primary care provider.        Please note that this dictation was created using voice recognition software. I have made every reasonable attempt to correct obvious errors, but I expect that there are errors of grammar and possibly content that I did not discover before finalizing the note.      LOREN Bahena.